# Patient Record
Sex: MALE | Race: WHITE | Employment: OTHER | URBAN - NONMETROPOLITAN AREA
[De-identification: names, ages, dates, MRNs, and addresses within clinical notes are randomized per-mention and may not be internally consistent; named-entity substitution may affect disease eponyms.]

---

## 2021-03-23 ENCOUNTER — IMMUNIZATION (OUTPATIENT)
Age: 81
End: 2021-03-23
Payer: MEDICARE

## 2021-03-23 PROCEDURE — 91300 COVID-19, PFIZER VACCINE 30MCG/0.3ML DOSE: CPT | Performed by: FAMILY MEDICINE

## 2021-03-23 PROCEDURE — 0002A COVID-19, PFIZER VACCINE 30MCG/0.3ML DOSE: CPT | Performed by: FAMILY MEDICINE

## 2024-11-20 PROBLEM — M12.811 ROTATOR CUFF ARTHROPATHY OF RIGHT SHOULDER: Status: ACTIVE | Noted: 2024-11-20

## 2024-11-20 PROBLEM — M12.812 ROTATOR CUFF ARTHROPATHY OF LEFT SHOULDER: Status: ACTIVE | Noted: 2024-11-20

## 2024-11-21 ENCOUNTER — OFFICE VISIT (OUTPATIENT)
Age: 84
End: 2024-11-21

## 2024-11-21 VITALS — BODY MASS INDEX: 27.92 KG/M2 | WEIGHT: 195 LBS | HEIGHT: 70 IN

## 2024-11-21 DIAGNOSIS — M12.811 ROTATOR CUFF ARTHROPATHY OF RIGHT SHOULDER: ICD-10-CM

## 2024-11-21 DIAGNOSIS — M12.812 ROTATOR CUFF ARTHROPATHY OF LEFT SHOULDER: Primary | ICD-10-CM

## 2024-11-21 RX ORDER — ATORVASTATIN CALCIUM 20 MG/1
20 TABLET, FILM COATED ORAL DAILY
COMMUNITY
Start: 2024-09-20

## 2024-11-21 RX ORDER — LIDOCAINE HYDROCHLORIDE 10 MG/ML
2 INJECTION, SOLUTION INFILTRATION; PERINEURAL ONCE
Status: COMPLETED | OUTPATIENT
Start: 2024-11-21 | End: 2024-11-21

## 2024-11-21 RX ORDER — ASPIRIN 81 MG/1
81 TABLET, CHEWABLE ORAL DAILY
COMMUNITY

## 2024-11-21 RX ORDER — LOSARTAN POTASSIUM 100 MG/1
100 TABLET ORAL DAILY
COMMUNITY

## 2024-11-21 RX ORDER — METOPROLOL SUCCINATE 25 MG/1
12.5 TABLET, EXTENDED RELEASE ORAL 2 TIMES DAILY
COMMUNITY
Start: 2024-08-30

## 2024-11-21 RX ORDER — TRIAMCINOLONE ACETONIDE 40 MG/ML
40 INJECTION, SUSPENSION INTRA-ARTICULAR; INTRAMUSCULAR ONCE
Status: COMPLETED | OUTPATIENT
Start: 2024-11-21 | End: 2024-11-21

## 2024-11-21 RX ORDER — BUPIVACAINE HYDROCHLORIDE 2.5 MG/ML
2 INJECTION, SOLUTION INFILTRATION; PERINEURAL ONCE
Status: COMPLETED | OUTPATIENT
Start: 2024-11-21 | End: 2024-11-21

## 2024-11-21 RX ADMIN — TRIAMCINOLONE ACETONIDE 40 MG: 40 INJECTION, SUSPENSION INTRA-ARTICULAR; INTRAMUSCULAR at 11:10

## 2024-11-21 RX ADMIN — LIDOCAINE HYDROCHLORIDE 2 ML: 10 INJECTION, SOLUTION INFILTRATION; PERINEURAL at 11:09

## 2024-11-21 RX ADMIN — BUPIVACAINE HYDROCHLORIDE 5 MG: 2.5 INJECTION, SOLUTION INFILTRATION; PERINEURAL at 11:02

## 2024-11-21 RX ADMIN — BUPIVACAINE HYDROCHLORIDE 5 MG: 2.5 INJECTION, SOLUTION INFILTRATION; PERINEURAL at 11:03

## 2024-11-21 RX ADMIN — LIDOCAINE HYDROCHLORIDE 2 ML: 10 INJECTION, SOLUTION INFILTRATION; PERINEURAL at 11:03

## 2024-11-21 RX ADMIN — TRIAMCINOLONE ACETONIDE 40 MG: 40 INJECTION, SUSPENSION INTRA-ARTICULAR; INTRAMUSCULAR at 11:09

## 2025-02-12 NOTE — PROGRESS NOTES
Orthopaedic Clinic Note - Established Patient    NAME:  Devin Maki   : 1940  MRN: [id number]    2025    CHIEF COMPLAINT:  follow up bilateral shoulder pain, repeat injection    HISTORY OF PRESENT ILLNESS:   The patient is a 84 y.o. right hand dominant male who returns today for follow up of bilateral shoulder pain, requesting repeat injection. It has been 3 months since last injection. Patient denies any recent trauma, fall, or other other injury. Pain is rated 6/10 today. He reports his left shoulder is worse than his right.    Past Medical History:        Diagnosis Date    Allergic rhinitis     Bladder cancer (HCC)     CAD (coronary artery disease)     Colon polyps     Gastritis     Hiatal hernia     Hyperlipidemia     Hypertension        Past Surgical History:        Procedure Laterality Date    COLONOSCOPY      Dr. Torres    COLONOSCOPY      Dr. Torres--colon polyps    COLONOSCOPY N/A 2016    Dr Gauthier-Tubular AP x 1, dysplasia (-), HP x 3, 5 yr recall    CORONARY ARTERY BYPASS GRAFT      stents    CORONARY ARTERY BYPASS GRAFT      UPPER GASTROINTESTINAL ENDOSCOPY  3/7/2011    [facility]    VASCULAR SURGERY      carotid artery cleaned out       Current Medications:   Prior to Admission medications    Medication Sig Start Date End Date Taking? Authorizing Provider   aspirin 81 MG chewable tablet Take 1 tablet by mouth daily   Yes Provider, Historical, MD   losartan (COZAAR) 100 MG tablet Take 1 tablet by mouth daily   Yes Provider, MD Mary   metoprolol succinate (TOPROL XL) 25 MG extended release tablet Take 0.5 tablets by mouth 2 times daily 24  Yes Provider, Historical, MD   atorvastatin (LIPITOR) 20 MG tablet Take 1 tablet by mouth daily 24  Yes Provider, Historical, MD   quinapril (ACCUPRIL) 20 MG tablet Take 1 tablet by mouth daily   Yes Provider, Historical, MD   pantoprazole (PROTONIX) 40 MG tablet Take 1 tablet by mouth daily   Yes Provider,

## 2025-02-27 ENCOUNTER — OFFICE VISIT (OUTPATIENT)
Age: 85
End: 2025-02-27

## 2025-02-27 VITALS — BODY MASS INDEX: 26.34 KG/M2 | WEIGHT: 184 LBS | HEIGHT: 70 IN

## 2025-02-27 DIAGNOSIS — M12.811 ROTATOR CUFF ARTHROPATHY OF RIGHT SHOULDER: ICD-10-CM

## 2025-02-27 DIAGNOSIS — M12.812 ROTATOR CUFF ARTHROPATHY OF LEFT SHOULDER: Primary | ICD-10-CM

## 2025-02-27 RX ORDER — BUPIVACAINE HYDROCHLORIDE 2.5 MG/ML
2 INJECTION, SOLUTION INFILTRATION; PERINEURAL ONCE
Status: COMPLETED | OUTPATIENT
Start: 2025-02-27 | End: 2025-02-27

## 2025-02-27 RX ORDER — LIDOCAINE HYDROCHLORIDE 10 MG/ML
2 INJECTION, SOLUTION INFILTRATION; PERINEURAL ONCE
Status: COMPLETED | OUTPATIENT
Start: 2025-02-27 | End: 2025-02-27

## 2025-02-27 RX ORDER — TRIAMCINOLONE ACETONIDE 40 MG/ML
40 INJECTION, SUSPENSION INTRA-ARTICULAR; INTRAMUSCULAR ONCE
Status: COMPLETED | OUTPATIENT
Start: 2025-02-27 | End: 2025-02-27

## 2025-02-27 RX ADMIN — TRIAMCINOLONE ACETONIDE 40 MG: 40 INJECTION, SUSPENSION INTRA-ARTICULAR; INTRAMUSCULAR at 09:59

## 2025-02-27 RX ADMIN — LIDOCAINE HYDROCHLORIDE 2 ML: 10 INJECTION, SOLUTION INFILTRATION; PERINEURAL at 09:59

## 2025-02-27 RX ADMIN — LIDOCAINE HYDROCHLORIDE 2 ML: 10 INJECTION, SOLUTION INFILTRATION; PERINEURAL at 09:58

## 2025-02-27 RX ADMIN — BUPIVACAINE HYDROCHLORIDE 5 MG: 2.5 INJECTION, SOLUTION INFILTRATION; PERINEURAL at 09:58

## 2025-06-12 NOTE — PROGRESS NOTES
MEDICAL ONCOLOGY CONSULTATION    Patient Name: Devin Maki  MRN: [id number]  YOB: 1940  Date of evaluation: 6/16/2025    REASON FOR CONSULTATION:  Bladder cancer/Prostate cancer  REQUESTING PHYSICIAN: Dr Anthony Jackman/[facility]    History Obtained From:  patient and old medical records    HISTORY OF PRESENT ILLNESS:    Diagnosis  #1 Cancer history-Bladder cancer  Noninvasive papillary urothelial carcinoma, bladder, Feb 2018  High grade  Recurrent noninvasive papillary urothelial carcinoma, bladder, April 2021  High grade  Recurrent noninvasive papillary urothelial carcinoma, bladder, Aug 2022  Low grade  Recurrent noninvasive papillary urothelial carcinoma, bladder, July 2023  Low grade  Multifocal recurrent noninvasive papillary urothelial carcinoma, bladder, Nov 2023  Low grade  Recurrent noninvasive papillary urothelial carcinoma, bladder, April 2024  Low grade  Recurrent noninvasive papillary urothelial carcinoma, bladder with extension into prostate, April 2024  Low grade  Invasive urothelial carcinoma, bladder, June 2025  WHO/ISUP grade: High  Stage IIIB; pT3a, pN2, cM0, May 2025    #2 Cancer history-Prostate cancer  Adenocarcinoma, prostate, March 2018  Archana 6  Acinar adenocarcinoma, Prostate cancer, June 2025  Archana score 7  Stage IIB; pT2, pN0, cM0      Treatment Summary  Bladder cancer, Feb 2018 2/26/18 Transurethral resection bladder tumor by Dr Grayson Peralta/[facility]  August 2018 - 2019 First cycle 18 months of BCG therapy by Dr Grayson Peralta/[facility]  4/1/21 Transurethral resection bladder tumor by Dr Grayson Peralta/[facility]  May 2021 Initiated second cycle 18 months of BCG therapy by Dr Grayson Peralta/[facility]  8/25/22 Transurethral resection bladder tumor by Dr Grayson Peralta/[facility]  1/26/23 Transurethral resection bladder tumor by Dr Grayson Peralta/[facility]  7/13/23 Transurethral resection bladder tumor by Dr Grayson Peralta/[facility]  11/9/23 Transurethral resection

## 2025-06-16 ENCOUNTER — HOSPITAL ENCOUNTER (OUTPATIENT)
Dept: INFUSION THERAPY | Age: 85
Discharge: HOME | End: 2025-06-16
Payer: MEDICARE

## 2025-06-16 ENCOUNTER — OFFICE VISIT (OUTPATIENT)
Dept: HEMATOLOGY | Age: 85
End: 2025-06-16
Payer: MEDICARE

## 2025-06-16 VITALS
BODY MASS INDEX: 25.96 KG/M2 | DIASTOLIC BLOOD PRESSURE: 40 MMHG | SYSTOLIC BLOOD PRESSURE: 120 MMHG | WEIGHT: 181.3 LBS | TEMPERATURE: 98 F | HEIGHT: 70 IN

## 2025-06-16 DIAGNOSIS — C67.9 MALIGNANT NEOPLASM OF URINARY BLADDER, UNSPECIFIED SITE (HCC): ICD-10-CM

## 2025-06-16 DIAGNOSIS — C68.9 UROTHELIAL CANCER (HCC): Primary | ICD-10-CM

## 2025-06-16 DIAGNOSIS — C68.9 UROTHELIAL CANCER (HCC): ICD-10-CM

## 2025-06-16 DIAGNOSIS — D50.9 MICROCYTIC ANEMIA: ICD-10-CM

## 2025-06-16 DIAGNOSIS — R53.81 PHYSICAL DECONDITIONING: ICD-10-CM

## 2025-06-16 DIAGNOSIS — E44.1 MILD PROTEIN-CALORIE MALNUTRITION: ICD-10-CM

## 2025-06-16 DIAGNOSIS — N28.9 RENAL IMPAIRMENT: ICD-10-CM

## 2025-06-16 DIAGNOSIS — E87.1 HYPONATREMIA: ICD-10-CM

## 2025-06-16 DIAGNOSIS — Z71.89 CARE PLAN DISCUSSED WITH PATIENT: ICD-10-CM

## 2025-06-16 DIAGNOSIS — C61 ADENOCARCINOMA OF PROSTATE (HCC): ICD-10-CM

## 2025-06-16 DIAGNOSIS — D75.838 REACTIVE THROMBOCYTOSIS: ICD-10-CM

## 2025-06-16 LAB
ALBUMIN SERPL-MCNC: 3.3 G/DL (ref 3.5–5.2)
ALP SERPL-CCNC: 106 U/L (ref 40–129)
ALT SERPL-CCNC: 13 U/L (ref 5–41)
ANION GAP SERPL CALCULATED.3IONS-SCNC: 12 MMOL/L (ref 7–19)
AST SERPL-CCNC: 28 U/L (ref 5–40)
BASOPHILS # BLD: 0.1 K/UL (ref 0–0.2)
BASOPHILS NFR BLD: 1 % (ref 0–1)
BILIRUB SERPL-MCNC: <0.2 MG/DL (ref 0–1.2)
BUN SERPL-MCNC: 36 MG/DL (ref 8–23)
CALCIUM SERPL-MCNC: 8.3 MG/DL (ref 8.8–10.2)
CHLORIDE SERPL-SCNC: 103 MMOL/L (ref 98–107)
CO2 SERPL-SCNC: 18 MMOL/L (ref 22–29)
CREAT SERPL-MCNC: 1.3 MG/DL (ref 0.7–1.2)
EOSINOPHIL # BLD: 0.71 K/UL (ref 0–0.6)
EOSINOPHIL NFR BLD: 6.9 % (ref 0–5)
ERYTHROCYTE [DISTWIDTH] IN BLOOD BY AUTOMATED COUNT: 16.8 % (ref 11.5–14.5)
GLUCOSE SERPL-MCNC: 134 MG/DL (ref 70–99)
HCT VFR BLD AUTO: 26.6 % (ref 42–52)
HGB BLD-MCNC: 8.3 G/DL (ref 14–18)
LYMPHOCYTES # BLD: 2.49 K/UL (ref 1.1–4.5)
LYMPHOCYTES NFR BLD: 24.1 % (ref 20–40)
MCH RBC QN AUTO: 22.4 PG (ref 27–31)
MCHC RBC AUTO-ENTMCNC: 31.2 G/DL (ref 33–37)
MCV RBC AUTO: 71.7 FL (ref 80–94)
MONOCYTES # BLD: 1.36 K/UL (ref 0–0.9)
MONOCYTES NFR BLD: 13.2 % (ref 1–10)
NEUTROPHILS # BLD: 5.54 K/UL (ref 1.5–7.5)
NEUTS SEG NFR BLD: 53.5 % (ref 50–65)
PLATELET # BLD AUTO: 522 K/UL (ref 130–400)
PMV BLD AUTO: 9.5 FL (ref 9.4–12.4)
POTASSIUM SERPL-SCNC: 5.1 MMOL/L (ref 3.5–5.1)
PROT SERPL-MCNC: 6.7 G/DL (ref 6.4–8.3)
RBC # BLD AUTO: 3.71 M/UL (ref 4.7–6.1)
SODIUM SERPL-SCNC: 133 MMOL/L (ref 136–145)
WBC # BLD AUTO: 10.33 K/UL (ref 4.8–10.8)

## 2025-06-16 PROCEDURE — 1125F AMNT PAIN NOTED PAIN PRSNT: CPT | Performed by: INTERNAL MEDICINE

## 2025-06-16 PROCEDURE — 80053 COMPREHEN METABOLIC PANEL: CPT

## 2025-06-16 PROCEDURE — 1123F ACP DISCUSS/DSCN MKR DOCD: CPT | Performed by: INTERNAL MEDICINE

## 2025-06-16 PROCEDURE — 36415 COLL VENOUS BLD VENIPUNCTURE: CPT

## 2025-06-16 PROCEDURE — 99205 OFFICE O/P NEW HI 60 MIN: CPT | Performed by: INTERNAL MEDICINE

## 2025-06-16 PROCEDURE — 99214 OFFICE O/P EST MOD 30 MIN: CPT

## 2025-06-16 PROCEDURE — 1159F MED LIST DOCD IN RCRD: CPT | Performed by: INTERNAL MEDICINE

## 2025-06-16 PROCEDURE — 85025 COMPLETE CBC W/AUTO DIFF WBC: CPT

## 2025-06-16 PROCEDURE — G2211 COMPLEX E/M VISIT ADD ON: HCPCS | Performed by: INTERNAL MEDICINE

## 2025-06-16 RX ORDER — LANOLIN ALCOHOL/MO/W.PET/CERES
1000 CREAM (GRAM) TOPICAL DAILY
COMMUNITY

## 2025-06-16 RX ORDER — TRAMADOL HYDROCHLORIDE 50 MG/1
1 TABLET ORAL EVERY 8 HOURS PRN
COMMUNITY

## 2025-06-16 RX ORDER — APIXABAN 2.5 MG/1
2.5 TABLET, FILM COATED ORAL 2 TIMES DAILY
COMMUNITY
Start: 2025-06-06

## 2025-06-16 RX ORDER — LEVOFLOXACIN 500 MG/1
500 TABLET, FILM COATED ORAL DAILY
COMMUNITY
Start: 2025-02-12

## 2025-06-16 RX ORDER — LORAZEPAM 1 MG/1
1 TABLET ORAL DAILY PRN
COMMUNITY

## 2025-06-16 RX ORDER — FUROSEMIDE 40 MG/1
40 TABLET ORAL DAILY
COMMUNITY

## 2025-06-17 ENCOUNTER — CLINICAL DOCUMENTATION (OUTPATIENT)
Dept: HEMATOLOGY | Age: 85
End: 2025-06-17

## 2025-06-17 DIAGNOSIS — C68.9 UROTHELIAL CANCER (HCC): Primary | ICD-10-CM

## 2025-06-17 DIAGNOSIS — D50.9 MICROCYTIC ANEMIA: Primary | ICD-10-CM

## 2025-06-17 NOTE — PROGRESS NOTES
Patient was referred to [facility]. They do not accept patients insurance. New referral placed to [facility] in hopes they will be able to help serve patient with their home health needs. Electronically signed by Brittney Arroyo RN on 6/17/2025 at 3:53 PM

## 2025-06-18 ENCOUNTER — HOSPITAL ENCOUNTER (OUTPATIENT)
Dept: WOUND CARE | Age: 85
Discharge: HOME | End: 2025-06-18
Attending: SURGERY
Payer: MEDICARE

## 2025-06-18 VITALS
TEMPERATURE: 97.5 F | WEIGHT: 181.3 LBS | HEIGHT: 69 IN | DIASTOLIC BLOOD PRESSURE: 53 MMHG | RESPIRATION RATE: 16 BRPM | SYSTOLIC BLOOD PRESSURE: 126 MMHG | BODY MASS INDEX: 26.85 KG/M2

## 2025-06-18 PROCEDURE — 99213 OFFICE O/P EST LOW 20 MIN: CPT

## 2025-06-18 RX ORDER — ACETAMINOPHEN 325 MG/1
650 TABLET ORAL EVERY 6 HOURS PRN
COMMUNITY

## 2025-06-18 NOTE — PATIENT INSTRUCTIONS
[facility] Wound Care and Hyperbaric Oxygen Therapy   Physician Orders and Discharge Instructions  [address] [place] [address]  [address]  [place] [address]  Telephone: [phone]      FAX [phone]    NAME:  Devni Maki  YOB: 1940  MEDICAL RECORD NUMBER:  [id number]  DATE:  6/18/2025    Discharge condition: Stable    Discharge to: Home    Left via:Private automobile    Accompanied by:  daughter    ECF/HHA:            [facility]  Coloplast 1-piece Custom      Change Pouch and Wafer: Every 3-5 days and as needed for leaking   Supplies:     SenSura Norberto Flex flat #[id number]   Brava protective Seal thin #66064  Brava Powder #[id number]  Brava Elastic Barrier Strips #[id number]  3M Cavilon No Sting Skin Barrier #[id number]  Esenta Sting Free Adhesive Remover Spray #[id number] [id number]    Gather supplies needed to change pouch and wafer.     Gently remove your old pouch     Look at the back of pouch before throwing away to see how it has worn.    Wash the skin around your stoma with water. Pat completely dry.    Use the measuring guide to measure your stoma size or use the old pattern to trace correct size opening on plastic backing of wafer.     Cut out the opening and remove the plastic backing from the wafer.      Optional - Apply Protective Barrier to skin around the stoma or to the back of the wafer.  If skin irritated dust with stoma powder, dust off the excess, then apply no sting skin protectant over the powdered areas    Apply Barrier Ring (gasket) to the opening of the wafer, then center your wafer by centering the opening of the wafer around the stoma and press the wafer down firmly.      Be sure to roll the end up 3 times and flip the tabs over to secure.    Lay quietly for 15-20 minutes to allow the adhesives to mold to your skin.    Empty Pouch    Empty the pouch when it’s 1/3 to 1/2 full of gas or stool     Wipe out tail-end of pouch with toilet tissue.      Close the end of the

## 2025-06-18 NOTE — PLAN OF CARE
Problem: ABCDS Injury Assessment  Goal: Absence of physical injury  Outcome: Progressing   Uses cane  Problem: Wound:  Goal: Will show signs of wound healing; wound closure and no evidence of infection  Description: Will show signs of wound healing; wound closure and no evidence of infection  Outcome: Progressing   Urostomy Care  Problem: Smoking cessation:  Goal: Ability to formulate a plan to maintain a tobacco-free life will be supported  Description: Ability to formulate a plan to maintain a tobacco-free life will be supported  Outcome: Progressing   Smokeless tobacco

## 2025-06-18 NOTE — WOUND CARE
Clinical Level of Care Assessment    Outpatient Ostomy Care      NAME:  Devin Maki  YOB: 1940  MEDICAL RECORD NUMBER:  [id number]   DATE:  6/18/2025      Patient /Ostomy Assessment- Document in Flowsheet I&O   Points   Review of chart [x]   0   Assess Complete Ostomy tab in Navigator for assessment of; stoma status, peristomal skin, presence of hernia/stool consistency/diet/related medications   Simple adjustments to pouch size/pouch system, new stoma pattern, accessory addition/deletion.   []   1   Assess Complete Ostomy tab in Navigator for assessment of; stoma status, peristomal skin, presence of hernia/stool consistency/diet/related medications   Moderate adjustments to pouch size/pouch system, new stoma pattern, accessory addition/deletion.  Observe patient/caregiver with hands-on care.   1-2 adjustments to pouch size/system/skin care/accessory addition or deletion.    [x]   2   Assess Complete Ostomy tab in Navigator for assessment of; stoma status, peristomal skin, presence of hernia/stool consistency/diet/related medications   Complex adjustments to pouch size/pouch system, new stoma pattern, accessory addition/deletion.  3 or more complex adjustments to pouch size/system/skin care/accessory addition or deletion.  Observe patient/caregiver with hands-on care.   Assess patient/patient abdomen for optimal pre-marked stoma site.  Assess patient abdomen for type of hernia belt/accessory needed. []   3         Ambulation Status Documented in CN Clinical Note  Status Definition Points   Independent Independently able to ambulate.  Fully able (without any assistance) to get on/off exam table/chair.    []   0   Minimal Physical Assistance Requires physical assistance of one person to ambulate and/or position patient to be examined. Includes necessary physical assistance to position lower extremities on/off stool.   [x]   1   Moderate Physical Assistance Requires at least one staff member to 
[facility] Outpatient   Ostomy Note      NAME:  Devin Maki  MEDICAL RECORD NUMBER:  [id number]  AGE: 85 y.o.   GENDER:  male  :  1940  TODAY'S DATE:  2025    Subjective       Chief Complaint   Patient presents with    Wound Check     Urostomycare         HISTORY of PRESENT ILLNESS HPI    Devin Maki is a 85 y.o. male New patient referred by Dr. Ezekiel Crum, who presents today for ostomy/stoma evaluation.   History of Ostomy Context: 25 Robotic assisted laparoscopic radical cystectomy, prostatectomy, positive () pelvic lymphadenectomy, intracorporeal ileal conduit, omental pedicle flap by Dr Anthony Jackman/[facility]  Wound/Ulcer Pain Timing/Severity: none  Quality of pain: N/A  Severity:  0 / 10   Associated Signs/Symptoms: no pain voiced at this time    PAST MEDICAL HISTORY        Diagnosis Date    Allergic rhinitis     Bladder cancer (HCC)     CAD (coronary artery disease)     Colon polyps     Gastritis     Hiatal hernia     Hyperlipidemia     Hypertension        PAST SURGICAL HISTORY    Past Surgical History:   Procedure Laterality Date    COLONOSCOPY      Dr. Torres    COLONOSCOPY      Dr. Torres--colon polyps    COLONOSCOPY N/A 2016    Dr Gauthier-Tubular AP x 1, dysplasia (-), HP x 3, 5 yr recall    CORONARY ARTERY BYPASS GRAFT      stents    CORONARY ARTERY BYPASS GRAFT      UPPER GASTROINTESTINAL ENDOSCOPY  3/7/2011    [facility]    VASCULAR SURGERY      carotid artery cleaned out       FAMILY HISTORY    Family History   Problem Relation Age of Onset    Cancer Mother         tongue cancer    Heart Disease Father     Stomach Cancer Sister     Prostate Cancer Brother     Unknown Maternal Grandmother     Unknown Maternal Grandfather     Unknown Paternal Grandmother     Unknown Paternal Grandfather     Colon Cancer Neg Hx     Colon Polyps Neg Hx     Esophageal Cancer Neg Hx     Liver Disease Neg Hx     Liver Cancer Neg Hx     Rectal Cancer Neg Hx  
questions and concerns addressed.    Plan to follow up in one week, to see how peristomal skin is healing and to follow up on using the 1 piece appliance.     Patient is to begin learning to change the appliance himself with next visit.       Urostomy RLQ (Active)   Stomal Appliance 1 piece 06/18/25 1333   Flange Size (inches) 2.25 Inches 06/18/25 1333   Stoma  Assessment Protrudes;Moist;Red;Other (Comment) 06/18/25 1333   Peristomal Assessment Pink;Denuded 06/18/25 1333   Mucocutaneous Junction Intact 06/18/25 1333   Collection Container Belly bag 06/18/25 1333   Securement Method Tape 06/18/25 1333   Treatment Barrier ring;Pouch change;Liquid skin barrier;Stoma powder;Site care;Tape changed 06/18/25 1333   Urine Color Yellow 06/18/25 1333   Urine Appearance Clear 06/18/25 1333   Output (ml) 100 ml 06/18/25 1333   Number of days: 16       Urostomy RLQ (Active)   Number of days: 16       LABS       CBC:   Lab Results   Component Value Date/Time    WBC 10.33 06/16/2025 02:21 PM    HGB 8.3 06/16/2025 02:21 PM    HCT 26.6 06/16/2025 02:21 PM    MCV 71.7 06/16/2025 02:21 PM     06/16/2025 02:21 PM     BMP:   Lab Results   Component Value Date/Time     06/16/2025 02:21 PM    K 5.1 06/16/2025 02:21 PM     06/16/2025 02:21 PM    CO2 18 06/16/2025 02:21 PM    BUN 36 06/16/2025 02:21 PM    CREATININE 1.3 06/16/2025 02:21 PM     PT/INR: No results found for: \"PROTIME\", \"INR\"  Prealbumin: No results found for: \"PREALBUMIN\"  Albumin:No results found for: \"LABALBU\"  Sed Rate:No results found for: \"SEDRATE\"  Micro: No results found for: \"BC\"     Assessment     Ostomy Diagnosis:    Z43.6 Attention to Urostomy    Patient Active Problem List   Diagnosis Code    History of colon polyps Z86.0100    Rotator cuff arthropathy of left shoulder M12.812    Rotator cuff arthropathy of right shoulder M12.811    Malignant neoplasm of urinary bladder (HCC) C67.9         Plan   Patient examined and evaluated    Plan for Ostomy

## 2025-06-25 ENCOUNTER — HOSPITAL ENCOUNTER (OUTPATIENT)
Dept: WOUND CARE | Age: 85
Discharge: HOME | End: 2025-06-25
Attending: SURGERY
Payer: MEDICARE

## 2025-06-25 ENCOUNTER — TELEPHONE (OUTPATIENT)
Dept: HEMATOLOGY | Age: 85
End: 2025-06-25

## 2025-06-25 VITALS
SYSTOLIC BLOOD PRESSURE: 126 MMHG | DIASTOLIC BLOOD PRESSURE: 53 MMHG | RESPIRATION RATE: 16 BRPM | TEMPERATURE: 97.3 F | BODY MASS INDEX: 26.85 KG/M2 | WEIGHT: 181.3 LBS | HEART RATE: 59 BPM | HEIGHT: 69 IN

## 2025-06-25 DIAGNOSIS — D50.8 OTHER IRON DEFICIENCY ANEMIA: ICD-10-CM

## 2025-06-25 DIAGNOSIS — C68.9 UROTHELIAL CANCER (HCC): Primary | ICD-10-CM

## 2025-06-25 DIAGNOSIS — C61 ADENOCARCINOMA OF PROSTATE (HCC): ICD-10-CM

## 2025-06-25 PROCEDURE — 99212 OFFICE O/P EST SF 10 MIN: CPT

## 2025-06-25 NOTE — PROGRESS NOTES
recommendations of consideration of RT (category 2B.    Plan:  - Regular scans to monitor condition-neck 6-month December 2025  - Referral to Dr. Fleming for formal radiation therapy consultation  - Continue exercising  - Maintain a protein-rich diet  - Monitor blood pressure closely    2. Low blood pressure: Reports of dizziness and lightheadedness upon standing, likely related to current blood pressure medications.  - Stop taking Norvasc  - Monitor blood pressure at home  -Contact PCP for further adjustment  - Contact primary care physician for further adjustments to blood pressure management    3.Prostate cancer: Status post radical cystoprostatectomy and pelvic lymph node dissection, with no involvement of lymph nodes by prostatic cancer. Final pathological stage pT2 N0 M0.  - No further systemic therapy recommended.  - PSA will continue to be monitored.  - No intention to pursue radiation therapy at this time.    4.  Microcytic anemia  -Hemoglobin 9.0/MCV 72  - Anemia profile performed today-ferritin and iron profile  - Check B12/folate  - Hemolytic panel  - Iron profile consistent with iron deficiency anemia  - IV iron replacement.  Poor oral tolerance    ?? Iron Panel Interpretation (Suggestive of Iron Deficiency):  Test Result Reference Range Interpretation   Iron 29 µg/dL  µg/dL Low   TIBC 357 µg/dL 250-450 µg/dL Normal-High   % Saturation 8% 20-50% Very Low   Ferritin 46.9 ng/mL  ng/mL Low-Normal (borderline)   ?? Interpretation:  This pattern--low iron, low % saturation, borderline ferritin--is suggestive of early or mild iron deficiency. Ferritin can be falsely normal in inflammation, so a level <50 with low serum iron is still suspicious.    ?? Vitamin & Reticulocyte Panel:  Test Result Interpretation   Vitamin B12 630 pg/mL Normal   Folate 8.8 ng/mL Normal   Retic % 1.08% Low-Normal   Absolute Retic 0.0437 x10?/?L Low-Normal   Haptoglobin 286 mg/dL Normal-High   ?? Interpretation:  No

## 2025-06-25 NOTE — WOUND CARE
[facility] Outpatient   Ostomy Note      NAME:  Devin Maki  MEDICAL RECORD NUMBER:  [id number]  AGE: 85 y.o.   GENDER:  male  :  1940  TODAY'S DATE:  2025    Subjective       Chief Complaint   Patient presents with    Wound Check     urostomy         HISTORY of PRESENT ILLNESS HPI    Devin Maki is a 85 y.o. male Established patient , who presents today for ostomy/stoma evaluation.   History of Ostomy Context:  25 Robotic assisted laparoscopic radical cystectomy, prostatectomy, positive () pelvic lymphadenectomy, intracorporeal ileal conduit, omental pedicle flap by Dr Anthony Jackman/[facility]   Wound/Ulcer Pain Timing/Severity: none  Quality of pain: N/A  Severity:  0 / 10   Associated Signs/Symptoms: none    PAST MEDICAL HISTORY        Diagnosis Date    Allergic rhinitis     Bladder cancer (HCC)     CAD (coronary artery disease)     Colon polyps     Gastritis     Hiatal hernia     Hyperlipidemia     Hypertension        PAST SURGICAL HISTORY    Past Surgical History:   Procedure Laterality Date    COLONOSCOPY      Dr. Torres    COLONOSCOPY      Dr. Torres--colon polyps    COLONOSCOPY N/A 2016    Dr Gauthier-Tubular AP x 1, dysplasia (-), HP x 3, 5 yr recall    CORONARY ARTERY BYPASS GRAFT      stents    CORONARY ARTERY BYPASS GRAFT      UPPER GASTROINTESTINAL ENDOSCOPY  3/7/2011    [facility]    VASCULAR SURGERY      carotid artery cleaned out       FAMILY HISTORY    Family History   Problem Relation Age of Onset    Cancer Mother         tongue cancer    Heart Disease Father     Stomach Cancer Sister     Prostate Cancer Brother     Unknown Maternal Grandmother     Unknown Maternal Grandfather     Unknown Paternal Grandmother     Unknown Paternal Grandfather     Colon Cancer Neg Hx     Colon Polyps Neg Hx     Esophageal Cancer Neg Hx     Liver Disease Neg Hx     Liver Cancer Neg Hx     Rectal Cancer Neg Hx        SOCIAL HISTORY    Social History     Tobacco

## 2025-06-25 NOTE — PLAN OF CARE
Problem: Wound:  Goal: Will show signs of wound healing; wound closure and no evidence of infection  Description: Will show signs of wound healing; wound closure and no evidence of infection  Outcome: Progressing   Urostomy care

## 2025-06-25 NOTE — PATIENT INSTRUCTIONS
[facility] Wound Care and Hyperbaric Oxygen Therapy   Physician Orders and Discharge Instructions  [address] [place] [address]  [address]  [place] [address]  Telephone: [phone]      FAX [phone]    NAME:  Devin Maki  YOB: 1940  MEDICAL RECORD NUMBER:  [id number]  DATE:  6/25/2025    Discharge condition: Stable    Discharge to: Home    Left via:Private automobile    Accompanied by:  daughter    ECF/HHA:            [facility]  Coloplast 1-piece Custom      Change Pouch and Wafer: Every 3-5 days and as needed for leaking   Supplies:     SenSura Norberto Flex flat #[id number]   Brava protective Seal thin #32959  Brava Powder #[id number]  Brava Elastic Barrier Strips #[id number]  3M Cavilon No Sting Skin Barrier #[id number]  Esenta Sting Free Adhesive Remover Spray #Convatec [id number]    Gather supplies needed to change pouch and wafer.     Gently remove your old pouch     Look at the back of pouch before throwing away to see how it has worn.    Wash the skin around your stoma with water. Pat completely dry.    Use the measuring guide to measure your stoma size or use the old pattern to trace correct size opening on plastic backing of wafer.     Cut out the opening and remove the plastic backing from the wafer.      Optional - Apply Protective Barrier to skin around the stoma or to the back of the wafer.  If skin irritated dust with stoma powder, dust off the excess, then apply no sting skin protectant over the powdered areas    Apply Barrier Ring (gasket) to the opening of the wafer, then center your wafer by centering the opening of the wafer around the stoma and press the wafer down firmly.      Be sure to roll the end up 3 times and flip the tabs over to secure.    Lay quietly for 15-20 minutes to allow the adhesives to mold to your skin.    Empty Pouch    Empty the pouch when it’s 1/3 to 1/2 full of gas or stool     Wipe out tail-end of pouch with toilet tissue.      Close the end of the

## 2025-06-25 NOTE — WOUND CARE
Clinical Level of Care Assessment    Outpatient Ostomy Care      NAME:  Devin Maki  YOB: 1940  MEDICAL RECORD NUMBER:  [id number]   DATE:  6/25/2025      Patient /Ostomy Assessment- Document in Flowsheet I&O   Points   Review of chart [x]   0   Assess Complete Ostomy tab in Navigator for assessment of; stoma status, peristomal skin, presence of hernia/stool consistency/diet/related medications   Simple adjustments to pouch size/pouch system, new stoma pattern, accessory addition/deletion.   []   1   Assess Complete Ostomy tab in Navigator for assessment of; stoma status, peristomal skin, presence of hernia/stool consistency/diet/related medications   Moderate adjustments to pouch size/pouch system, new stoma pattern, accessory addition/deletion.  Observe patient/caregiver with hands-on care.   1-2 adjustments to pouch size/system/skin care/accessory addition or deletion.    [x]   2   Assess Complete Ostomy tab in Navigator for assessment of; stoma status, peristomal skin, presence of hernia/stool consistency/diet/related medications   Complex adjustments to pouch size/pouch system, new stoma pattern, accessory addition/deletion.  3 or more complex adjustments to pouch size/system/skin care/accessory addition or deletion.  Observe patient/caregiver with hands-on care.   Assess patient/patient abdomen for optimal pre-marked stoma site.  Assess patient abdomen for type of hernia belt/accessory needed. []   3         Ambulation Status Documented in CN Clinical Note  Status Definition Points   Independent Independently able to ambulate.  Fully able (without any assistance) to get on/off exam table/chair.    []   0   Minimal Physical Assistance Requires physical assistance of one person to ambulate and/or position patient to be examined. Includes necessary physical assistance to position lower extremities on/off stool.   [x]   1   Moderate Physical Assistance Requires at least one staff member to

## 2025-06-26 ENCOUNTER — TELEPHONE (OUTPATIENT)
Dept: WOUND CARE | Age: 85
End: 2025-06-26

## 2025-06-27 ENCOUNTER — TELEPHONE (OUTPATIENT)
Dept: WOUND CARE | Age: 85
End: 2025-06-27

## 2025-06-27 NOTE — TELEPHONE ENCOUNTER
Call placed to patient and spoke with him. Informed that called [employer] [facility] [phone] and they will be sending out a urostomy night time drainage bag in 2-3 days. Discussed he will not need an adaptor with bag, that the end of this tbing will fit to the bottom drain spout of his urostomy appliance. Hopefully this will help with the leakage he has been having during the night with his other bag. Patient verbalized understanding.

## 2025-06-30 ENCOUNTER — HOSPITAL ENCOUNTER (OUTPATIENT)
Dept: INFUSION THERAPY | Age: 85
Discharge: HOME | End: 2025-06-30
Payer: MEDICARE

## 2025-06-30 ENCOUNTER — OFFICE VISIT (OUTPATIENT)
Dept: HEMATOLOGY | Age: 85
End: 2025-06-30

## 2025-06-30 ENCOUNTER — OFFICE VISIT (OUTPATIENT)
Dept: HEMATOLOGY | Age: 85
End: 2025-06-30
Payer: MEDICARE

## 2025-06-30 VITALS
DIASTOLIC BLOOD PRESSURE: 48 MMHG | OXYGEN SATURATION: 97 % | TEMPERATURE: 98.6 F | HEART RATE: 61 BPM | BODY MASS INDEX: 24.92 KG/M2 | SYSTOLIC BLOOD PRESSURE: 100 MMHG | HEIGHT: 70 IN | WEIGHT: 174.1 LBS

## 2025-06-30 VITALS — BODY MASS INDEX: 24.98 KG/M2 | HEIGHT: 70 IN

## 2025-06-30 DIAGNOSIS — D50.9 MICROCYTIC ANEMIA: ICD-10-CM

## 2025-06-30 DIAGNOSIS — C61 ADENOCARCINOMA OF PROSTATE (HCC): ICD-10-CM

## 2025-06-30 DIAGNOSIS — R53.81 PHYSICAL DECONDITIONING: ICD-10-CM

## 2025-06-30 DIAGNOSIS — Z71.89 CARE PLAN DISCUSSED WITH PATIENT: Primary | ICD-10-CM

## 2025-06-30 DIAGNOSIS — R53.1 WEAKNESS GENERALIZED: ICD-10-CM

## 2025-06-30 DIAGNOSIS — N28.9 RENAL IMPAIRMENT: ICD-10-CM

## 2025-06-30 DIAGNOSIS — D50.8 OTHER IRON DEFICIENCY ANEMIA: ICD-10-CM

## 2025-06-30 DIAGNOSIS — C68.9 UROTHELIAL CANCER (HCC): ICD-10-CM

## 2025-06-30 DIAGNOSIS — C68.9 UROTHELIAL CANCER (HCC): Primary | ICD-10-CM

## 2025-06-30 LAB
ALBUMIN SERPL-MCNC: 3.6 G/DL (ref 3.5–5.2)
ALP SERPL-CCNC: 153 U/L (ref 40–129)
ALT SERPL-CCNC: 17 U/L (ref 5–41)
ANION GAP SERPL CALCULATED.3IONS-SCNC: 13 MMOL/L (ref 7–19)
AST SERPL-CCNC: 29 U/L (ref 5–40)
BASOPHILS # BLD: 0.05 K/UL (ref 0–0.2)
BASOPHILS NFR BLD: 0.6 % (ref 0–1)
BILIRUB SERPL-MCNC: 0.3 MG/DL (ref 0–1.2)
BUN SERPL-MCNC: 36 MG/DL (ref 8–23)
CALCIUM SERPL-MCNC: 8.8 MG/DL (ref 8.8–10.2)
CHLORIDE SERPL-SCNC: 106 MMOL/L (ref 98–107)
CO2 SERPL-SCNC: 20 MMOL/L (ref 22–29)
CREAT SERPL-MCNC: 1.3 MG/DL (ref 0.7–1.2)
EOSINOPHIL # BLD: 0.45 K/UL (ref 0–0.6)
EOSINOPHIL NFR BLD: 5.5 % (ref 0–5)
ERYTHROCYTE [DISTWIDTH] IN BLOOD BY AUTOMATED COUNT: 18.1 % (ref 11.5–14.5)
FERRITIN SERPL-MCNC: 46.9 NG/ML (ref 30–400)
FOLATE SERPL-MCNC: 8.8 NG/ML (ref 4.5–32.2)
GLUCOSE SERPL-MCNC: 121 MG/DL (ref 70–99)
HAPTOGLOB SERPL-MCNC: 286 MG/DL (ref 30–200)
HCT VFR BLD AUTO: 29.2 % (ref 42–52)
HGB BLD-MCNC: 9 G/DL (ref 14–18)
IRON SATN MFR SERPL: 8 % (ref 20–50)
IRON SERPL-MCNC: 29 UG/DL (ref 59–158)
LYMPHOCYTES # BLD: 1.97 K/UL (ref 1.1–4.5)
LYMPHOCYTES NFR BLD: 24 % (ref 20–40)
MCH RBC QN AUTO: 22.2 PG (ref 27–31)
MCHC RBC AUTO-ENTMCNC: 30.8 G/DL (ref 33–37)
MCV RBC AUTO: 72.1 FL (ref 80–94)
MONOCYTES # BLD: 0.84 K/UL (ref 0–0.9)
MONOCYTES NFR BLD: 10.2 % (ref 1–10)
NEUTROPHILS # BLD: 4.87 K/UL (ref 1.5–7.5)
NEUTS SEG NFR BLD: 59.3 % (ref 50–65)
PLATELET # BLD AUTO: 241 K/UL (ref 130–400)
PMV BLD AUTO: 11.1 FL (ref 9.4–12.4)
POTASSIUM SERPL-SCNC: 3.9 MMOL/L (ref 3.5–5.1)
PROT SERPL-MCNC: 7.1 G/DL (ref 6.4–8.3)
RBC # BLD AUTO: 4.05 M/UL (ref 4.7–6.1)
RETICS # AUTO: 0.04 M/UL (ref 0.03–0.12)
RETICS/RBC NFR: 1.08 % (ref 0.5–1.5)
SODIUM SERPL-SCNC: 139 MMOL/L (ref 136–145)
TIBC SERPL-MCNC: 357 UG/DL (ref 250–400)
VIT B12 SERPL-MCNC: 630 PG/ML (ref 232–1245)
WBC # BLD AUTO: 8.21 K/UL (ref 4.8–10.8)

## 2025-06-30 PROCEDURE — 82607 VITAMIN B-12: CPT

## 2025-06-30 PROCEDURE — 1159F MED LIST DOCD IN RCRD: CPT | Performed by: INTERNAL MEDICINE

## 2025-06-30 PROCEDURE — 99215 OFFICE O/P EST HI 40 MIN: CPT | Performed by: INTERNAL MEDICINE

## 2025-06-30 PROCEDURE — 83010 ASSAY OF HAPTOGLOBIN QUANT: CPT

## 2025-06-30 PROCEDURE — 85045 AUTOMATED RETICULOCYTE COUNT: CPT

## 2025-06-30 PROCEDURE — 80053 COMPREHEN METABOLIC PANEL: CPT

## 2025-06-30 PROCEDURE — G2211 COMPLEX E/M VISIT ADD ON: HCPCS | Performed by: INTERNAL MEDICINE

## 2025-06-30 PROCEDURE — 85025 COMPLETE CBC W/AUTO DIFF WBC: CPT

## 2025-06-30 PROCEDURE — 1124F ACP DISCUSS-NO DSCNMKR DOCD: CPT | Performed by: INTERNAL MEDICINE

## 2025-06-30 PROCEDURE — 99213 OFFICE O/P EST LOW 20 MIN: CPT

## 2025-06-30 PROCEDURE — 83540 ASSAY OF IRON: CPT

## 2025-06-30 PROCEDURE — 82746 ASSAY OF FOLIC ACID SERUM: CPT

## 2025-06-30 PROCEDURE — 82728 ASSAY OF FERRITIN: CPT

## 2025-06-30 PROCEDURE — 83550 IRON BINDING TEST: CPT

## 2025-06-30 PROCEDURE — 1126F AMNT PAIN NOTED NONE PRSNT: CPT | Performed by: INTERNAL MEDICINE

## 2025-06-30 PROCEDURE — 36415 COLL VENOUS BLD VENIPUNCTURE: CPT

## 2025-06-30 NOTE — PROGRESS NOTES
[facility] Oncology & Hematology  [address] [place] [address] [place] [address]  Phone: [phone]  Fax: [phone]    Chanda Zavala, MS, RD, LD   Devin Shanthi 85 y.o.   Diagnosis, staging, date of diagnosis: recurrent invasive urothelial carcinoma, bladder, June 2025; adenocarcinoma of prostate, June 2025  Current Treatment: surveillance only; s/p cystectomy, prostatectomy, pelvic lymphadenectomy, ileal conduit, omental pedicle flap     Comprehensive Nutrition Assessment    Type and Reason for Visit:  Initial, Consult    Malnutrition Assessment:  Malnutrition Status:  At risk for malnutrition (06/30/25 1446)    Context:  Chronic Illness     Findings of the 6 clinical characteristics of malnutrition:  Energy Intake:  Mild decrease in energy intake  Weight Loss:  Greater than 10% over 6 months       Nutrition Assessment:   Referral received from Dr. Mamadou Azar MD for pt with dx invasive urothelial carcinoma and prostate adenocarcinoma. Pt presents nutritionally compromised AEB wt loss 7lbs over the past two weeks and reported poor energy intake. Pt endorses decreased appetite since surgery on 6/2/25. Pt denies constipation or diarrhea, but does note his bowel movements are not as regular as they were before his surgery.     Diet History:  Pt eats 2 meals most days and snacks in between. He most often eats Cherrios with whole milk for breakfast. He often drinks a Boost shake in the middle of the day. Occasionally, he will eat a half sandwich at lunchtime. Dinner meal varies, but he ate a grilled cheese last night. He drinks water and Gatorade throughout the day.     Nutrition Related Laboratory Data:  Na+=139  K+=3.9  Twy=931  BUN=36  Cr=1.3  GFR 54--hx CKD    Anthropometric Measures:  Height: 177.8 cm (5' 10\")  Ideal Body Weight (IBW): 166 lbs (75 kg)       Current Body Weight: 79 kg (174 lb 1.6 oz), 104.9 % IBW.    Current BMI (kg/m2): 25  Wt Readings from Last 5 Encounters:

## 2025-07-01 DIAGNOSIS — D50.8 IRON DEFICIENCY ANEMIA SECONDARY TO INADEQUATE DIETARY IRON INTAKE: Primary | ICD-10-CM

## 2025-07-01 NOTE — PROGRESS NOTES
Iron plan built at this time. Patient will go to outpatient infusion center for Iron replacement. I will call and notify patient. Electronically signed by Brittney Arroyo RN on 7/1/2025 at 7:24 AM

## 2025-07-02 ENCOUNTER — CLINICAL DOCUMENTATION (OUTPATIENT)
Dept: HEMATOLOGY | Age: 85
End: 2025-07-02

## 2025-07-02 PROBLEM — Z87.891 FORMER SMOKER: Status: ACTIVE | Noted: 2025-07-02

## 2025-07-02 RX ORDER — EPINEPHRINE 1 MG/ML
0.3 INJECTION, SOLUTION, CONCENTRATE INTRAVENOUS PRN
OUTPATIENT
Start: 2025-07-07

## 2025-07-02 RX ORDER — SODIUM CHLORIDE 9 MG/ML
INJECTION, SOLUTION INTRAVENOUS PRN
OUTPATIENT
Start: 2025-07-07

## 2025-07-02 RX ORDER — ACETAMINOPHEN 325 MG/1
650 TABLET ORAL
OUTPATIENT
Start: 2025-07-07

## 2025-07-02 RX ORDER — SODIUM CHLORIDE 9 MG/ML
5-250 INJECTION, SOLUTION INTRAVENOUS PRN
OUTPATIENT
Start: 2025-07-07

## 2025-07-02 RX ORDER — ONDANSETRON 2 MG/ML
8 INJECTION INTRAMUSCULAR; INTRAVENOUS
OUTPATIENT
Start: 2025-07-07

## 2025-07-02 RX ORDER — SODIUM CHLORIDE 0.9 % (FLUSH) 0.9 %
5-40 SYRINGE (ML) INJECTION PRN
OUTPATIENT
Start: 2025-07-07

## 2025-07-02 RX ORDER — ALBUTEROL SULFATE 90 UG/1
4 INHALANT RESPIRATORY (INHALATION) PRN
OUTPATIENT
Start: 2025-07-07

## 2025-07-02 RX ORDER — HEPARIN 100 UNIT/ML
500 SYRINGE INTRAVENOUS PRN
OUTPATIENT
Start: 2025-07-07

## 2025-07-02 RX ORDER — HYDROCORTISONE SODIUM SUCCINATE 100 MG/2ML
100 INJECTION INTRAMUSCULAR; INTRAVENOUS
OUTPATIENT
Start: 2025-07-07

## 2025-07-02 RX ORDER — DIPHENHYDRAMINE HYDROCHLORIDE 50 MG/ML
50 INJECTION, SOLUTION INTRAMUSCULAR; INTRAVENOUS
OUTPATIENT
Start: 2025-07-07

## 2025-07-02 NOTE — PROGRESS NOTES
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     Current packs/day: 0.00     Average packs/day: 1 pack/day for 7.0 years (7.0 ttl pk-yrs)     Types: Cigarettes     Start date:      Quit date:      Years since quittin.5    Smokeless tobacco: Never   Vaping Use    Vaping status: Never Used   Substance Use Topics    Alcohol use: No    Drug use: No     ALLERGIES  Lisinopril, Sulfamethoxazole-trimethoprim, Anacin [aspirin-caffeine], and Sulfa antibiotics     MEDICATIONS    Current Outpatient Medications:     aspirin 81 MG chewable tablet, Chew 1 tablet Daily., Disp: , Rfl:     atorvastatin (LIPITOR) 20 MG tablet, TAKE 1 TABLET BY MOUTH EVERY DAY, Disp: 90 tablet, Rfl: 3    cetirizine (zyrTEC) 10 MG tablet, Take 1 tablet by mouth Daily., Disp: , Rfl:     furosemide (LASIX) 40 MG tablet, Take 1 tablet by mouth Daily., Disp: , Rfl: 5    losartan (COZAAR) 100 MG tablet, Take 1 tablet by mouth Daily., Disp: , Rfl:     metoprolol succinate XL (TOPROL-XL) 25 MG 24 hr tablet, Take 1 tablet by mouth Daily. (Patient taking differently: Take 0.5 tablets by mouth Daily.), Disp: , Rfl: 3    nitroglycerin (NITROSTAT) 0.4 MG SL tablet, Place 1 tablet under the tongue  "Every 5 (Five) Minutes As Needed for Chest Pain. Take no more than 3 doses in 15 minutes., Disp: , Rfl:     pantoprazole (PROTONIX) 40 MG EC tablet, Take 1 tablet by mouth Daily., Disp: , Rfl:     amLODIPine (NORVASC) 5 MG tablet, Take 1 tablet by mouth Daily. (Patient not taking: Reported on 7/8/2025), Disp: , Rfl:     Current outpatient and discharge medications have been reconciled for the patient.  Reviewed by: Brian Patricia MD    The following portions of the patient's history were reviewed and updated as appropriate: allergies, current medications, past family history, past medical history, past social history, past surgical history and problem list.    REVIEW OF SYSTEMS  Review of Systems   Constitutional:  Positive for appetite change (decrease), fatigue and unexpected weight change (decrease of 20 lbs over last month).   HENT: Negative.     Eyes: Negative.         Glasses   Respiratory: Negative.     Cardiovascular: Negative.    Gastrointestinal: Negative.    Endocrine: Negative.    Genitourinary: Negative.         Urostomy bag in place with clear, yellow urine   Musculoskeletal:  Positive for gait problem (ambulating with cane).   Skin: Negative.    Allergic/Immunologic: Negative.    Neurological:  Positive for weakness (generalized).   Hematological: Negative.    Psychiatric/Behavioral: Negative.       Implant: NO    PHYSICAL EXAM  VITAL SIGNS:   Vitals:    07/08/25 1411   BP: 160/56   Weight: 78.5 kg (173 lb)   Height: 177.8 cm (70\")   PainSc: 0-No pain     Physical Exam  Vitals and nursing note reviewed. Exam conducted with a chaperone present.   Constitutional:       Appearance: He is normal weight.   HENT:      Head: Normocephalic.   Cardiovascular:      Rate and Rhythm: Normal rate and regular rhythm.   Pulmonary:      Effort: Pulmonary effort is normal.      Breath sounds: Normal breath sounds.   Abdominal:      General: Abdomen is protuberant. The ostomy site is clean. There is distension.      " Palpations: Abdomen is soft. There is no mass.      Tenderness: There is no abdominal tenderness.          Comments: Patient has a well-functioning ileal conduit ostomy bag that is clean and dry.   Musculoskeletal:         General: Normal range of motion.      Cervical back: Normal range of motion.      Right lower leg: No edema.      Left lower leg: No edema.   Lymphadenopathy:      Cervical: No cervical adenopathy.      Upper Body:      Right upper body: No supraclavicular or axillary adenopathy.      Left upper body: No supraclavicular or axillary adenopathy.   Neurological:      General: No focal deficit present.      Mental Status: He is alert and oriented to person, place, and time.   Psychiatric:         Mood and Affect: Mood normal.         Behavior: Behavior normal.         Thought Content: Thought content normal.         Judgment: Judgment normal.       Performance Status: ECOG (2) Ambulatory and capable of self care, unable to carry out work activity, up and about > 50% or waking hours    Clinical Quality Measures  -Pain Documented by Standardized Tool, FPS:  Jean Claude Peraza reports a pain score of 0.  Given his pain assessment as noted, treatment options were discussed and the following options were decided upon as a follow-up plan to address the patient's pain: N/A.    - Body Mass Index Screening and Follow-Up Plan  BMI is within normal parameters. No other follow-up for BMI required.    Tobacco Use: Screening and Cessation Intervention  Social History    Tobacco Use      Smoking status: Former        Packs/day: 0.00        Years: 1 pack/day for 7.0 years (7.0 ttl pk-yrs)        Types: Cigarettes        Start date:         Quit date: 1970        Years since quittin.5      Smokeless tobacco: Never    Advanced Care Planning: Advance Care Planning  ACP discussion was held with the patient during this visit. Patient does not have an advance directive, information provided.    - PHQ-2 Depression  Screening:  Little interest or pleasure in doing things? Not at all   Feeling down, depressed, or hopeless? Not at all   PHQ-2 Total Score 0     ASSESSMENT AND PLAN  1. Malignant neoplasm of urinary bladder, unspecified site    2. Former smoker      No orders of the defined types were placed in this encounter.    RECOMMENDATIONS: Jean Claude Peraza is a pleasant 85 y.o. 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  Patient is status post radical cystoprostatectomy with tumor invading the perivesical soft tissues microscopically with perineural/perivascular invasion.  Given patient's recurrence and tumor characteristics, along with his inability to receive systemic therapy, I agree with Dr. Jackson salvage radiation therapy to the pelvis and tumor bed is indicated.    Indications and rationale of radiation therapy according to the NCCN Guidelines to the bladder has been discussed with the patient today. I have extensively reviewed the risks, benefits and alternatives of therapy and progression of disease in spite of therapy with either local or systemic failure.  Side effects of radiation to the pelvic region include but are not limited to sunburn-type skin irritation of the targeted area (which may range from mild to  Intense, red, dry, tender, or itchy skin, general fatigue, diarrhea, rectal bleeding, hemorrhoids, vaginal itching, burning, and dryness for women, Incontinence of bowel or bladder, bladder irritation and sexual problems.    I discussed the goals and plans of care with the patient and family and answered all questions. In consideration of the diagnostic data and evaluation of the patient, it is my recommendation that this patient be treated with a course of radiation therapy.  We will plan for a dose of 5040 cGy to the bladder and pelvic lymph nodes in the first 28 fractions, then a total of 7040cGy over an  additional 10 fractions to the bladder without systemic therapy due to advanced age and comorbidities.    The patient verbalizes understanding of this discussion, voices no further questions and wishes to proceed with recommended therapy. We will perform CT simulation next week to initiate the treatment planning, final dose pending treatment planning.    Thank you for allowing me to assist in this patients care, he will continue ongoing management per primary care physician and other specialists.    Return in 1 week (on 7/15/2025) for CT Simulation.    Time Spent: I spent 90 minutes caring for Jean Claude on this date of service. This time includes time spent by me in the following activities: preparing for the visit, reviewing tests, obtaining and/or reviewing a separately obtained history, performing a medically appropriate examination and/or evaluation, counseling and educating the patient/family/caregiver, ordering medications, tests, or procedures, and documenting information in the medical record.   Brian Patricia MD  07/08/2025

## 2025-07-02 NOTE — PROGRESS NOTES
Patients wife notified about labs and Iron infusion plan. She was advised that once approved by insurance a staff member will call them to schedule. She was informed that infusion will be administered at the outpatient infusion center at the hospital. She verbalized understanding. Electronically signed by Brittney Arroyo RN on 7/2/2025 at 8:05 AM

## 2025-07-07 ENCOUNTER — HOSPITAL ENCOUNTER (OUTPATIENT)
Dept: RADIATION ONCOLOGY | Facility: HOSPITAL | Age: 85
Setting detail: RADIATION/ONCOLOGY SERIES
End: 2025-07-07
Payer: MEDICARE

## 2025-07-08 ENCOUNTER — CONSULT (OUTPATIENT)
Age: 85
End: 2025-07-08
Payer: MEDICARE

## 2025-07-08 VITALS
BODY MASS INDEX: 24.77 KG/M2 | HEIGHT: 70 IN | WEIGHT: 173 LBS | DIASTOLIC BLOOD PRESSURE: 56 MMHG | SYSTOLIC BLOOD PRESSURE: 160 MMHG

## 2025-07-08 DIAGNOSIS — Z87.891 FORMER SMOKER: ICD-10-CM

## 2025-07-08 DIAGNOSIS — C67.9 MALIGNANT NEOPLASM OF URINARY BLADDER, UNSPECIFIED SITE: Primary | ICD-10-CM

## 2025-07-08 PROCEDURE — G0463 HOSPITAL OUTPT CLINIC VISIT: HCPCS | Performed by: RADIOLOGY

## 2025-07-09 ENCOUNTER — HOSPITAL ENCOUNTER (OUTPATIENT)
Dept: WOUND CARE | Age: 85
Discharge: HOME | End: 2025-07-09
Attending: SURGERY
Payer: MEDICARE

## 2025-07-09 VITALS
TEMPERATURE: 97.6 F | DIASTOLIC BLOOD PRESSURE: 61 MMHG | RESPIRATION RATE: 18 BRPM | HEART RATE: 58 BPM | SYSTOLIC BLOOD PRESSURE: 142 MMHG

## 2025-07-09 PROCEDURE — 99212 OFFICE O/P EST SF 10 MIN: CPT

## 2025-07-09 PROCEDURE — 77263 THER RADIOLOGY TX PLNG CPLX: CPT | Performed by: RADIOLOGY

## 2025-07-09 NOTE — DISCHARGE INSTRUCTIONS
[facility] Wound Care and Hyperbaric Oxygen Therapy   Physician Orders and Discharge Instructions  [address] [place] [address]  [address]  [place] [address]  Telephone: [phone]      FAX [phone]    NAME:  Devin Maki  YOB: 1940  MEDICAL RECORD NUMBER:  [id number]  DATE:  7/9/2025    Discharge condition: Stable    Discharge to: Home    Left via:Private automobile    Accompanied by:  daughter    ECF/HHA:   Coloplast 2-piece Custom      Change Pouch and Wafer: Every 3-5 days and as needed for leaking   Supplies:       Brava protective Seal thin #00217  Brava Powder #49596  Brava Elastic Barrier Strips #[id number]  3M Cavilon No Sting Skin Barrier #3343  Esenta Sting Free Adhesive Remover Spray #Convatec 284640  Coloplast #71192 urostomy pouch  Wafer # 26937 light convex wafer    Gather supplies needed to change pouch and wafer.     Gently remove your old pouch     Look at the back of pouch before throwing away to see how it has worn.    Wash the skin around your stoma with water. Pat completely dry.    Use the measuring guide to measure your stoma size or use the old pattern to trace correct size opening on plastic backing of wafer.     Cut out the opening and remove the plastic backing from the wafer.      Optional - Apply Protective Barrier to skin around the stoma or to the back of the wafer.  If skin irritated dust with stoma powder, dust off the excess, then apply no sting skin protectant over the powdered areas    Apply Barrier Ring (gasket) to the opening of the wafer, then center your wafer by centering the opening of the wafer around the stoma and press the wafer down firmly.      Be sure to roll the end up 3 times and flip the tabs over to secure.    Lay quietly for 15-20 minutes to allow the adhesives to mold to your skin.    Empty Pouch    Empty the pouch when it’s 1/3 to 1/2 full of gas or stool     Wipe out tail-end of pouch with toilet tissue.      Close the end of the pouch

## 2025-07-09 NOTE — PATIENT INSTRUCTIONS
[facility] Wound Care and Hyperbaric Oxygen Therapy   Physician Orders and Discharge Instructions  [address] [place] [address]  [address]  [place] [address]  Telephone: [phone]      FAX [phone]    NAME:  Devin Maki  YOB: 1940  MEDICAL RECORD NUMBER:  [id number]  DATE:  7/9/2025    Discharge condition: Stable    Discharge to: Home    Left via:Private automobile    Accompanied by:  daughter    ECF/HHA:   Coloplast 2-piece Custom      Change Pouch and Wafer: Every 3-5 days and as needed for leaking   Supplies:       Brava protective Seal thin #38409  Brava Powder #11832  Brava Elastic Barrier Strips #[id number]  3M Cavilon No Sting Skin Barrier #3343  Esenta Sting Free Adhesive Remover Spray #Convatec [id number]  Coloplast #55768 urostomy pouch  Wafer # 84739 light convex wafer    Gather supplies needed to change pouch and wafer.     Gently remove your old pouch     Look at the back of pouch before throwing away to see how it has worn.    Wash the skin around your stoma with water. Pat completely dry.    Use the measuring guide to measure your stoma size or use the old pattern to trace correct size opening on plastic backing of wafer.     Cut out the opening and remove the plastic backing from the wafer.      Optional - Apply Protective Barrier to skin around the stoma or to the back of the wafer.  If skin irritated dust with stoma powder, dust off the excess, then apply no sting skin protectant over the powdered areas    Apply Barrier Ring (gasket) to the opening of the wafer, then center your wafer by centering the opening of the wafer around the stoma and press the wafer down firmly.      Be sure to roll the end up 3 times and flip the tabs over to secure.    Lay quietly for 15-20 minutes to allow the adhesives to mold to your skin.    Empty Pouch    Empty the pouch when it’s 1/3 to 1/2 full of gas or stool     Wipe out tail-end of pouch with toilet tissue.      Close the end of the

## 2025-07-09 NOTE — WOUND CARE
Clinical Level of Care Assessment    Outpatient Ostomy Care      NAME:  Devin Maki  YOB: 1940  MEDICAL RECORD NUMBER:  [id number]   DATE:  7/9/2025      Patient /Ostomy Assessment- Document in Flowsheet I&O   Points   Review of chart []   0   Assess Complete Ostomy tab in Navigator for assessment of; stoma status, peristomal skin, presence of hernia/stool consistency/diet/related medications   Simple adjustments to pouch size/pouch system, new stoma pattern, accessory addition/deletion.   []   1   Assess Complete Ostomy tab in Navigator for assessment of; stoma status, peristomal skin, presence of hernia/stool consistency/diet/related medications   Moderate adjustments to pouch size/pouch system, new stoma pattern, accessory addition/deletion.  Observe patient/caregiver with hands-on care.   1-2 adjustments to pouch size/system/skin care/accessory addition or deletion.    [x]   2   Assess Complete Ostomy tab in Navigator for assessment of; stoma status, peristomal skin, presence of hernia/stool consistency/diet/related medications   Complex adjustments to pouch size/pouch system, new stoma pattern, accessory addition/deletion.  3 or more complex adjustments to pouch size/system/skin care/accessory addition or deletion.  Observe patient/caregiver with hands-on care.   Assess patient/patient abdomen for optimal pre-marked stoma site.  Assess patient abdomen for type of hernia belt/accessory needed. []   3         Ambulation Status Documented in CN Clinical Note  Status Definition Points   Independent Independently able to ambulate.  Fully able (without any assistance) to get on/off exam table/chair.    []   0   Minimal Physical Assistance Requires physical assistance of one person to ambulate and/or position patient to be examined. Includes necessary physical assistance to position lower extremities on/off stool.   [x]   1   Moderate Physical Assistance Requires at least one staff member to physically 
Current pouching system     Current Diet: No diet orders on file  Dietician consult:  N/A      Referrals:  []   [] Home Health Care  [] Supplies  [x] Other Stoma Genie - will call to see if samples can be sent to patirnt      Patient/Caregiver Teaching:  Written Instructions given to patient/family see patient instructions  Teaching provided:  [x] Reviewed GI and A&P        [] Supplies  [x] Pouch emptying      [] Manipulate closure  [x] Routine Care         [] Comment  [x] Pouch maintenance           Level of patient/caregiver understanding able to:  [x] Indicates understanding       [x] Needs reinforcement  [] Unsuccessful      [x] Verbal Understanding  [] Demonstrated understanding       [] No evidence of learning  [] Refused teaching         [] N/A      Follow up: 1 week    Please see attached Discharge Instructions    Written patient dismissal instructions given to patient and signed by patient or POA.         I spent 45 minutes providing ostomy evaluation, education and follow-up care.    Electronically signed by Cynthia Schmidt RN on 7/9/2025 at 1:28 PM

## 2025-07-14 ENCOUNTER — HOSPITAL ENCOUNTER (OUTPATIENT)
Dept: WOUND CARE | Age: 85
Discharge: HOME | End: 2025-07-14
Attending: SURGERY
Payer: MEDICARE

## 2025-07-14 ENCOUNTER — HOSPITAL ENCOUNTER (OUTPATIENT)
Dept: INFUSION THERAPY | Age: 85
Setting detail: INFUSION SERIES
Discharge: HOME | End: 2025-07-14
Payer: MEDICARE

## 2025-07-14 VITALS
SYSTOLIC BLOOD PRESSURE: 116 MMHG | HEART RATE: 48 BPM | RESPIRATION RATE: 18 BRPM | OXYGEN SATURATION: 100 % | TEMPERATURE: 97.5 F | DIASTOLIC BLOOD PRESSURE: 43 MMHG

## 2025-07-14 VITALS
DIASTOLIC BLOOD PRESSURE: 54 MMHG | SYSTOLIC BLOOD PRESSURE: 114 MMHG | BODY MASS INDEX: 24.92 KG/M2 | HEART RATE: 53 BPM | WEIGHT: 174.1 LBS | TEMPERATURE: 96.2 F | HEIGHT: 70 IN | RESPIRATION RATE: 18 BRPM

## 2025-07-14 DIAGNOSIS — D50.8 IRON DEFICIENCY ANEMIA SECONDARY TO INADEQUATE DIETARY IRON INTAKE: Primary | ICD-10-CM

## 2025-07-14 PROCEDURE — 96366 THER/PROPH/DIAG IV INF ADDON: CPT

## 2025-07-14 PROCEDURE — 6360000002 HC RX W HCPCS: Performed by: INTERNAL MEDICINE

## 2025-07-14 PROCEDURE — 99212 OFFICE O/P EST SF 10 MIN: CPT

## 2025-07-14 PROCEDURE — 96365 THER/PROPH/DIAG IV INF INIT: CPT

## 2025-07-14 PROCEDURE — 2580000003 HC RX 258: Performed by: INTERNAL MEDICINE

## 2025-07-14 RX ORDER — SODIUM CHLORIDE 0.9 % (FLUSH) 0.9 %
5-40 SYRINGE (ML) INJECTION PRN
Status: DISCONTINUED | OUTPATIENT
Start: 2025-07-14 | End: 2025-07-15 | Stop reason: HOSPADM

## 2025-07-14 RX ORDER — SODIUM CHLORIDE 0.9 % (FLUSH) 0.9 %
5-40 SYRINGE (ML) INJECTION PRN
Status: CANCELLED | OUTPATIENT
Start: 2025-07-28

## 2025-07-14 RX ORDER — SODIUM CHLORIDE 9 MG/ML
5-250 INJECTION, SOLUTION INTRAVENOUS PRN
OUTPATIENT
Start: 2025-07-28

## 2025-07-14 RX ORDER — SODIUM CHLORIDE 9 MG/ML
INJECTION, SOLUTION INTRAVENOUS PRN
OUTPATIENT
Start: 2025-07-28

## 2025-07-14 RX ORDER — ONDANSETRON 2 MG/ML
8 INJECTION INTRAMUSCULAR; INTRAVENOUS
OUTPATIENT
Start: 2025-07-28

## 2025-07-14 RX ORDER — DIPHENHYDRAMINE HYDROCHLORIDE 50 MG/ML
50 INJECTION, SOLUTION INTRAMUSCULAR; INTRAVENOUS
OUTPATIENT
Start: 2025-07-28

## 2025-07-14 RX ORDER — ACETAMINOPHEN 325 MG/1
650 TABLET ORAL
OUTPATIENT
Start: 2025-07-28

## 2025-07-14 RX ORDER — ALBUTEROL SULFATE 90 UG/1
4 INHALANT RESPIRATORY (INHALATION) PRN
OUTPATIENT
Start: 2025-07-28

## 2025-07-14 RX ORDER — HEPARIN 100 UNIT/ML
500 SYRINGE INTRAVENOUS PRN
OUTPATIENT
Start: 2025-07-28

## 2025-07-14 RX ORDER — SODIUM CHLORIDE 9 MG/ML
5-250 INJECTION, SOLUTION INTRAVENOUS PRN
Status: CANCELLED | OUTPATIENT
Start: 2025-07-28

## 2025-07-14 RX ORDER — HYDROCORTISONE SODIUM SUCCINATE 100 MG/2ML
100 INJECTION INTRAMUSCULAR; INTRAVENOUS
OUTPATIENT
Start: 2025-07-28

## 2025-07-14 RX ORDER — EPINEPHRINE 1 MG/ML
0.3 INJECTION, SOLUTION, CONCENTRATE INTRAVENOUS PRN
OUTPATIENT
Start: 2025-07-28

## 2025-07-14 RX ORDER — SODIUM CHLORIDE 9 MG/ML
5-250 INJECTION, SOLUTION INTRAVENOUS PRN
Status: DISCONTINUED | OUTPATIENT
Start: 2025-07-14 | End: 2025-07-15 | Stop reason: HOSPADM

## 2025-07-14 RX ADMIN — IRON SUCROSE 300 MG: 20 INJECTION, SOLUTION INTRAVENOUS at 12:01

## 2025-07-14 NOTE — WOUND CARE
Clinical Level of Care Assessment    Outpatient Ostomy Care      NAME:  Devin Maki  YOB: 1940  MEDICAL RECORD NUMBER:  [id number]   DATE:  7/14/2025      Patient /Ostomy Assessment- Document in Flowsheet I&O   Points   Review of chart [x]   0   Assess Complete Ostomy tab in Navigator for assessment of; stoma status, peristomal skin, presence of hernia/stool consistency/diet/related medications   Simple adjustments to pouch size/pouch system, new stoma pattern, accessory addition/deletion.   [x]   1   Assess Complete Ostomy tab in Navigator for assessment of; stoma status, peristomal skin, presence of hernia/stool consistency/diet/related medications   Moderate adjustments to pouch size/pouch system, new stoma pattern, accessory addition/deletion.  Observe patient/caregiver with hands-on care.   1-2 adjustments to pouch size/system/skin care/accessory addition or deletion.    []   2   Assess Complete Ostomy tab in Navigator for assessment of; stoma status, peristomal skin, presence of hernia/stool consistency/diet/related medications   Complex adjustments to pouch size/pouch system, new stoma pattern, accessory addition/deletion.  3 or more complex adjustments to pouch size/system/skin care/accessory addition or deletion.  Observe patient/caregiver with hands-on care.   Assess patient/patient abdomen for optimal pre-marked stoma site.  Assess patient abdomen for type of hernia belt/accessory needed. []   3         Ambulation Status Documented in CN Clinical Note  Status Definition Points   Independent Independently able to ambulate.  Fully able (without any assistance) to get on/off exam table/chair.    []   0   Minimal Physical Assistance Requires physical assistance of one person to ambulate and/or position patient to be examined. Includes necessary physical assistance to position lower extremities on/off stool.   [x]   1   Moderate Physical Assistance Requires at least one staff member to

## 2025-07-14 NOTE — PATIENT INSTRUCTIONS
[facility] Wound Care and Hyperbaric Oxygen Therapy   Physician Orders and Discharge Instructions  [address] [place] [address]  [address]  [place] [address]  Telephone: [phone]      FAX [phone]    NAME:  Devin Maki  YOB: 1940  MEDICAL RECORD NUMBER:  [id number]  DATE:  7/14/2025    Discharge condition: Stable    Discharge to: Home    Left via:Private automobile    Accompanied by:  daughter    ECF/HHA: [employer]        Coloplast 2-piece Custom      Change Pouch and Wafer: Every 3-5 days and as needed for leaking   Supplies:       Brava protective Seal thin #26550  Brava Powder #92216  Brava Elastic Barrier Strips #[id number]  3M Cavilon No Sting Skin Barrier #3343  Esenta Sting Free Adhesive Remover Spray #Convatec [id number]  Coloplast #44466 urostomy pouch  Wafer # 78319 light convex wafer    Will order some samples of the Stoma Genie ostomy output capture cartridge [email or web address] [email or web address] for patient to trial.      Gather supplies needed to change pouch and wafer.     Gently remove your old pouch     Look at the back of pouch before throwing away to see how it has worn.    Wash the skin around your stoma with water. Pat completely dry.    Use the measuring guide to measure your stoma size or use the old pattern to trace correct size opening on plastic backing of wafer.     Cut out the opening and remove the plastic backing from the wafer.      Optional - Apply Protective Barrier to skin around the stoma or to the back of the wafer.  If skin irritated dust with stoma powder, dust off the excess, then apply no sting skin protectant over the powdered areas    Apply Barrier Ring (gasket) to the opening of the wafer, then center your wafer by centering the opening of the wafer around the stoma and press the wafer down firmly.      Be sure to roll the end up 3 times and flip the tabs over to secure.    Lay quietly for 15-20 minutes to allow the adhesives to mold to your skin.    Empty

## 2025-07-14 NOTE — PROGRESS NOTES
PIV inserted and 300 mg venofer given as ordered. Pt monitored for 30 mins post infusion. Pt tolerated well.

## 2025-07-14 NOTE — WOUND CARE
[facility] Outpatient   Ostomy Note      NAME:  Devin Maki  MEDICAL RECORD NUMBER:  [id number]  AGE: 85 y.o.   GENDER:  male  :  1940  TODAY'S DATE:  2025    Subjective       Chief Complaint   Patient presents with    Wound Check     Urostomy care         HISTORY of PRESENT ILLNESS HPI    Devin Maki is a 85 y.o. male Established patient , who presents today for ostomy/stoma evaluation.   History of Ostomy Context:  25 Robotic assisted laparoscopic radical cystectomy, prostatectomy, positive () pelvic lymphadenectomy, intracorporeal ileal conduit, omental pedicle flap by Dr Anthony Jackman/[facility]   Wound/Ulcer Pain Timing/Severity: none  Quality of pain: N/A  Severity:  0 / 10   Associated Signs/Symptoms: none    PAST MEDICAL HISTORY        Diagnosis Date    Allergic rhinitis     Bladder cancer (HCC)     CAD (coronary artery disease)     Colon polyps     Gastritis     Hiatal hernia     Hyperlipidemia     Hypertension        PAST SURGICAL HISTORY    Past Surgical History:   Procedure Laterality Date    BLADDER SURGERY  2025    bladder removed    COLONOSCOPY  2011    Dr. Torres    COLONOSCOPY  2007    Dr. Torres--colon polyps    COLONOSCOPY N/A 2016    Dr Gauthier-Tubular AP x 1, dysplasia (-), HP x 3, 5 yr recall    CORONARY ARTERY BYPASS GRAFT  2001    stents    CORONARY ARTERY BYPASS GRAFT  2000    UPPER GASTROINTESTINAL ENDOSCOPY  2011    [facility]    VASCULAR SURGERY      carotid artery cleaned out       FAMILY HISTORY    Family History   Problem Relation Age of Onset    Cancer Mother         tongue cancer    Heart Disease Father     Stomach Cancer Sister     Prostate Cancer Brother     Unknown Maternal Grandmother     Unknown Maternal Grandfather     Unknown Paternal Grandmother     Unknown Paternal Grandfather     Colon Cancer Neg Hx     Colon Polyps Neg Hx     Esophageal Cancer Neg Hx     Liver Disease Neg Hx     Liver Cancer

## 2025-07-15 PROCEDURE — 77334 RADIATION TREATMENT AID(S): CPT | Performed by: RADIOLOGY

## 2025-07-21 ENCOUNTER — HOSPITAL ENCOUNTER (OUTPATIENT)
Dept: INFUSION THERAPY | Age: 85
Setting detail: INFUSION SERIES
Discharge: HOME | End: 2025-07-21
Payer: MEDICARE

## 2025-07-21 VITALS
TEMPERATURE: 97.7 F | RESPIRATION RATE: 16 BRPM | SYSTOLIC BLOOD PRESSURE: 97 MMHG | OXYGEN SATURATION: 99 % | DIASTOLIC BLOOD PRESSURE: 54 MMHG | HEART RATE: 70 BPM

## 2025-07-21 DIAGNOSIS — D50.8 IRON DEFICIENCY ANEMIA SECONDARY TO INADEQUATE DIETARY IRON INTAKE: Primary | ICD-10-CM

## 2025-07-21 PROCEDURE — 2500000003 HC RX 250 WO HCPCS: Performed by: INTERNAL MEDICINE

## 2025-07-21 PROCEDURE — 77301 RADIOTHERAPY DOSE PLAN IMRT: CPT | Performed by: RADIOLOGY

## 2025-07-21 PROCEDURE — 77300 RADIATION THERAPY DOSE PLAN: CPT | Performed by: RADIOLOGY

## 2025-07-21 PROCEDURE — 77338 DESIGN MLC DEVICE FOR IMRT: CPT | Performed by: RADIOLOGY

## 2025-07-21 PROCEDURE — 96365 THER/PROPH/DIAG IV INF INIT: CPT

## 2025-07-21 PROCEDURE — 6360000002 HC RX W HCPCS: Performed by: INTERNAL MEDICINE

## 2025-07-21 PROCEDURE — 96366 THER/PROPH/DIAG IV INF ADDON: CPT

## 2025-07-21 PROCEDURE — 2580000003 HC RX 258: Performed by: INTERNAL MEDICINE

## 2025-07-21 RX ORDER — DIPHENHYDRAMINE HYDROCHLORIDE 50 MG/ML
50 INJECTION, SOLUTION INTRAMUSCULAR; INTRAVENOUS
OUTPATIENT
Start: 2025-07-28

## 2025-07-21 RX ORDER — SODIUM CHLORIDE 9 MG/ML
5-250 INJECTION, SOLUTION INTRAVENOUS PRN
Status: CANCELLED | OUTPATIENT
Start: 2025-07-28

## 2025-07-21 RX ORDER — ONDANSETRON 2 MG/ML
8 INJECTION INTRAMUSCULAR; INTRAVENOUS
OUTPATIENT
Start: 2025-07-28

## 2025-07-21 RX ORDER — SODIUM CHLORIDE 9 MG/ML
5-250 INJECTION, SOLUTION INTRAVENOUS PRN
Status: DISCONTINUED | OUTPATIENT
Start: 2025-07-21 | End: 2025-07-22 | Stop reason: HOSPADM

## 2025-07-21 RX ORDER — SODIUM CHLORIDE 9 MG/ML
INJECTION, SOLUTION INTRAVENOUS PRN
OUTPATIENT
Start: 2025-07-28

## 2025-07-21 RX ORDER — SODIUM CHLORIDE 0.9 % (FLUSH) 0.9 %
5-40 SYRINGE (ML) INJECTION PRN
Status: CANCELLED | OUTPATIENT
Start: 2025-07-28

## 2025-07-21 RX ORDER — ACETAMINOPHEN 325 MG/1
650 TABLET ORAL
OUTPATIENT
Start: 2025-07-28

## 2025-07-21 RX ORDER — EPINEPHRINE 1 MG/ML
0.3 INJECTION, SOLUTION, CONCENTRATE INTRAVENOUS PRN
OUTPATIENT
Start: 2025-07-28

## 2025-07-21 RX ORDER — HYDROCORTISONE SODIUM SUCCINATE 100 MG/2ML
100 INJECTION INTRAMUSCULAR; INTRAVENOUS
OUTPATIENT
Start: 2025-07-28

## 2025-07-21 RX ORDER — ALBUTEROL SULFATE 90 UG/1
4 INHALANT RESPIRATORY (INHALATION) PRN
OUTPATIENT
Start: 2025-07-28

## 2025-07-21 RX ORDER — HEPARIN 100 UNIT/ML
500 SYRINGE INTRAVENOUS PRN
OUTPATIENT
Start: 2025-07-28

## 2025-07-21 RX ORDER — SODIUM CHLORIDE 0.9 % (FLUSH) 0.9 %
5-40 SYRINGE (ML) INJECTION PRN
Status: DISCONTINUED | OUTPATIENT
Start: 2025-07-21 | End: 2025-07-22 | Stop reason: HOSPADM

## 2025-07-21 RX ORDER — SODIUM CHLORIDE 9 MG/ML
5-250 INJECTION, SOLUTION INTRAVENOUS PRN
OUTPATIENT
Start: 2025-07-28

## 2025-07-21 RX ADMIN — IRON SUCROSE 300 MG: 20 INJECTION, SOLUTION INTRAVENOUS at 12:11

## 2025-07-21 RX ADMIN — SODIUM CHLORIDE, PRESERVATIVE FREE 10 ML: 5 INJECTION INTRAVENOUS at 14:14

## 2025-07-24 ENCOUNTER — HOSPITAL ENCOUNTER (OUTPATIENT)
Dept: PHYSICAL THERAPY | Age: 85
Setting detail: THERAPIES SERIES
Discharge: HOME | End: 2025-07-24
Payer: MEDICARE

## 2025-07-24 ENCOUNTER — HOSPITAL ENCOUNTER (OUTPATIENT)
Dept: RADIATION ONCOLOGY | Facility: HOSPITAL | Age: 85
Discharge: HOME | End: 2025-07-24

## 2025-07-24 ENCOUNTER — DOCUMENTATION (OUTPATIENT)
Dept: RADIATION ONCOLOGY | Facility: HOSPITAL | Age: 85
End: 2025-07-24
Payer: MEDICARE

## 2025-07-24 PROCEDURE — 97162 PT EVAL MOD COMPLEX 30 MIN: CPT

## 2025-07-24 PROCEDURE — 77427 RADIATION TX MANAGEMENT X5: CPT | Performed by: RADIOLOGY

## 2025-07-24 PROCEDURE — 77014 CHG CT GUIDANCE RADIATION THERAPY FLDS PLACEMENT: CPT | Performed by: RADIOLOGY

## 2025-07-24 PROCEDURE — 77386: CPT | Performed by: RADIOLOGY

## 2025-07-24 PROCEDURE — 77385: CPT | Performed by: RADIOLOGY

## 2025-07-24 NOTE — PROGRESS NOTES
BRENDA met with Mr. Peraza who is here to start radiation treatment. BRENDA introduced self and explained role and source of support. He is 85 years old and lives with his wife. They have been  for 61 years. His support system includes his wife and two daughters. Currently, he does not have transportation or financial concerns.   He does not take any medication for anxiety/depression and he does not see a counselor. Mr. Peraza states he was nervous about radiation. Emotional support provided. He has trouble sleeping and states it has been going on for about two months. He will take naps, if needed but tries to stay up throughout the day. His coping strategies include going to his shop. No needs noted. BRENDA will remain available as needed.

## 2025-07-24 NOTE — PROGRESS NOTES
Physical Therapy: Initial Evaluation    Patient: Devin Maki (85 y.o. male)   Examination Date: 2025  Plan of Care Certification Period:2025 to        :  1940 ;    Confirmed: Yes MRN: [id number]  CSN: [id number]   Insurance: Payor: [employer] / Plan: [employer] MCR / Product Type: *No Product type* /   Insurance ID: [id number] - (Medicare Managed) Secondary Insurance (if applicable):    Referring Physician: Mamadou Azar MD Wederson Claudino, MD   PCP: Ezekiel Crum MD Visits to Date/Visits Approved:   (12-16 visits anticipated for this diagnosis)    No Show/Cancelled Appts:   /       Medical Diagnosis: Other malaise [R53.81]  Weakness [R53.1] physical deconditioning (R53.81), weakness generalized (R53.1)  Treatment Diagnosis:       PERTINENT MEDICAL HISTORY   Patient Assessed for Rehabilitation Services: Yes       Medical History: Chart Reviewed: Yes    Past Medical History:   Diagnosis Date    Allergic rhinitis     Bladder cancer (HCC)     CAD (coronary artery disease)     Colon polyps     Gastritis     Hiatal hernia     Hyperlipidemia     Hypertension      Surgical History:   Past Surgical History:   Procedure Laterality Date    BLADDER SURGERY  2025    bladder removed    COLONOSCOPY  2011    Dr. Torres    COLONOSCOPY  2007    Dr. Torres--colon polyps    COLONOSCOPY N/A 2016    Dr Gauthier-Tubular AP x 1, dysplasia (-), HP x 3, 5 yr recall    CORONARY ARTERY BYPASS GRAFT  2001    stents    CORONARY ARTERY BYPASS GRAFT  2000    UPPER GASTROINTESTINAL ENDOSCOPY  2011    [facility]    VASCULAR SURGERY      carotid artery cleaned out       Medications:   Current Outpatient Medications:     acetaminophen (TYLENOL) 325 MG tablet, Take 2 tablets by mouth every 6 hours as needed for Pain, Disp: , Rfl:     ELIQUIS 2.5 MG TABS tablet, Take 1 tablet by mouth 2 times daily (Patient not taking: Reported on 2025), Disp: , Rfl:

## 2025-07-25 ENCOUNTER — HOSPITAL ENCOUNTER (OUTPATIENT)
Dept: RADIATION ONCOLOGY | Facility: HOSPITAL | Age: 85
Discharge: HOME | End: 2025-07-25

## 2025-07-25 LAB
RAD ONC ARIA COURSE ID: NORMAL
RAD ONC ARIA COURSE INTENT: NORMAL
RAD ONC ARIA COURSE LAST TREATMENT DATE: NORMAL
RAD ONC ARIA COURSE START DATE: NORMAL
RAD ONC ARIA COURSE TREATMENT ELAPSED DAYS: 1
RAD ONC ARIA FIRST TREATMENT DATE: NORMAL
RAD ONC ARIA PLAN FRACTIONS TREATED TO DATE: 2
RAD ONC ARIA PLAN ID: NORMAL
RAD ONC ARIA PLAN PRESCRIBED DOSE PER FRACTION: 1.8 GY
RAD ONC ARIA PLAN PRIMARY REFERENCE POINT: NORMAL
RAD ONC ARIA PLAN TOTAL FRACTIONS PRESCRIBED: 28
RAD ONC ARIA PLAN TOTAL PRESCRIBED DOSE: 5040 CGY
RAD ONC ARIA REFERENCE POINT DOSAGE GIVEN TO DATE: 3.6 GY
RAD ONC ARIA REFERENCE POINT ID: NORMAL
RAD ONC ARIA REFERENCE POINT SESSION DOSAGE GIVEN: 1.8 GY

## 2025-07-25 PROCEDURE — 77386: CPT | Performed by: RADIOLOGY

## 2025-07-25 PROCEDURE — 77014 CHG CT GUIDANCE RADIATION THERAPY FLDS PLACEMENT: CPT | Performed by: RADIOLOGY

## 2025-07-28 ENCOUNTER — HOSPITAL ENCOUNTER (OUTPATIENT)
Dept: RADIATION ONCOLOGY | Facility: HOSPITAL | Age: 85
Discharge: HOME | End: 2025-07-28
Payer: MEDICARE

## 2025-07-28 ENCOUNTER — HOSPITAL ENCOUNTER (OUTPATIENT)
Dept: INFUSION THERAPY | Age: 85
Setting detail: INFUSION SERIES
Discharge: HOME | End: 2025-07-28
Payer: MEDICARE

## 2025-07-28 ENCOUNTER — HOSPITAL ENCOUNTER (OUTPATIENT)
Dept: WOUND CARE | Age: 85
Discharge: HOME | End: 2025-07-28
Attending: SURGERY
Payer: MEDICARE

## 2025-07-28 VITALS
SYSTOLIC BLOOD PRESSURE: 141 MMHG | TEMPERATURE: 97.6 F | RESPIRATION RATE: 18 BRPM | HEART RATE: 64 BPM | OXYGEN SATURATION: 100 % | DIASTOLIC BLOOD PRESSURE: 66 MMHG

## 2025-07-28 VITALS
HEART RATE: 50 BPM | BODY MASS INDEX: 24.92 KG/M2 | SYSTOLIC BLOOD PRESSURE: 132 MMHG | WEIGHT: 174.1 LBS | RESPIRATION RATE: 16 BRPM | TEMPERATURE: 97.8 F | HEIGHT: 70 IN | DIASTOLIC BLOOD PRESSURE: 58 MMHG

## 2025-07-28 DIAGNOSIS — D50.8 IRON DEFICIENCY ANEMIA SECONDARY TO INADEQUATE DIETARY IRON INTAKE: Primary | ICD-10-CM

## 2025-07-28 LAB
RAD ONC ARIA COURSE ID: NORMAL
RAD ONC ARIA COURSE INTENT: NORMAL
RAD ONC ARIA COURSE LAST TREATMENT DATE: NORMAL
RAD ONC ARIA COURSE START DATE: NORMAL
RAD ONC ARIA COURSE TREATMENT ELAPSED DAYS: 4
RAD ONC ARIA FIRST TREATMENT DATE: NORMAL
RAD ONC ARIA PLAN FRACTIONS TREATED TO DATE: 3
RAD ONC ARIA PLAN ID: NORMAL
RAD ONC ARIA PLAN PRESCRIBED DOSE PER FRACTION: 1.8 GY
RAD ONC ARIA PLAN PRIMARY REFERENCE POINT: NORMAL
RAD ONC ARIA PLAN TOTAL FRACTIONS PRESCRIBED: 28
RAD ONC ARIA PLAN TOTAL PRESCRIBED DOSE: 5040 CGY
RAD ONC ARIA REFERENCE POINT DOSAGE GIVEN TO DATE: 5.4 GY
RAD ONC ARIA REFERENCE POINT ID: NORMAL
RAD ONC ARIA REFERENCE POINT SESSION DOSAGE GIVEN: 1.8 GY

## 2025-07-28 PROCEDURE — 77386: CPT | Performed by: RADIOLOGY

## 2025-07-28 PROCEDURE — 96365 THER/PROPH/DIAG IV INF INIT: CPT

## 2025-07-28 PROCEDURE — 2580000003 HC RX 258: Performed by: INTERNAL MEDICINE

## 2025-07-28 PROCEDURE — 77014 CHG CT GUIDANCE RADIATION THERAPY FLDS PLACEMENT: CPT | Performed by: RADIOLOGY

## 2025-07-28 PROCEDURE — 96366 THER/PROPH/DIAG IV INF ADDON: CPT

## 2025-07-28 PROCEDURE — 6360000002 HC RX W HCPCS: Performed by: INTERNAL MEDICINE

## 2025-07-28 PROCEDURE — 99212 OFFICE O/P EST SF 10 MIN: CPT

## 2025-07-28 RX ORDER — GENTAMICIN SULFATE 1 MG/G
OINTMENT TOPICAL PRN
OUTPATIENT
Start: 2025-07-28

## 2025-07-28 RX ORDER — DIPHENHYDRAMINE HYDROCHLORIDE 50 MG/ML
50 INJECTION, SOLUTION INTRAMUSCULAR; INTRAVENOUS
OUTPATIENT
Start: 2025-08-11

## 2025-07-28 RX ORDER — LIDOCAINE 50 MG/G
OINTMENT TOPICAL PRN
OUTPATIENT
Start: 2025-07-28

## 2025-07-28 RX ORDER — ONDANSETRON 2 MG/ML
8 INJECTION INTRAMUSCULAR; INTRAVENOUS
OUTPATIENT
Start: 2025-08-11

## 2025-07-28 RX ORDER — SODIUM CHLORIDE 0.9 % (FLUSH) 0.9 %
5-40 SYRINGE (ML) INJECTION PRN
OUTPATIENT
Start: 2025-08-11

## 2025-07-28 RX ORDER — BACITRACIN ZINC AND POLYMYXIN B SULFATE 500; 1000 [USP'U]/G; [USP'U]/G
OINTMENT TOPICAL PRN
OUTPATIENT
Start: 2025-07-28

## 2025-07-28 RX ORDER — SODIUM CHLORIDE 9 MG/ML
INJECTION, SOLUTION INTRAVENOUS PRN
OUTPATIENT
Start: 2025-08-11

## 2025-07-28 RX ORDER — TRIAMCINOLONE ACETONIDE 1 MG/G
OINTMENT TOPICAL PRN
OUTPATIENT
Start: 2025-07-28

## 2025-07-28 RX ORDER — SODIUM CHLORIDE 9 MG/ML
5-250 INJECTION, SOLUTION INTRAVENOUS PRN
OUTPATIENT
Start: 2025-08-11

## 2025-07-28 RX ORDER — LIDOCAINE HYDROCHLORIDE 20 MG/ML
JELLY TOPICAL PRN
OUTPATIENT
Start: 2025-07-28

## 2025-07-28 RX ORDER — GINSENG 100 MG
CAPSULE ORAL PRN
OUTPATIENT
Start: 2025-07-28

## 2025-07-28 RX ORDER — ACETAMINOPHEN 325 MG/1
650 TABLET ORAL
OUTPATIENT
Start: 2025-08-11

## 2025-07-28 RX ORDER — LIDOCAINE 40 MG/G
CREAM TOPICAL PRN
OUTPATIENT
Start: 2025-07-28

## 2025-07-28 RX ORDER — HEPARIN 100 UNIT/ML
500 SYRINGE INTRAVENOUS PRN
OUTPATIENT
Start: 2025-08-11

## 2025-07-28 RX ORDER — SODIUM CHLORIDE 9 MG/ML
5-250 INJECTION, SOLUTION INTRAVENOUS PRN
Status: DISCONTINUED | OUTPATIENT
Start: 2025-07-28 | End: 2025-07-29 | Stop reason: HOSPADM

## 2025-07-28 RX ORDER — SODIUM CHLORIDE 0.9 % (FLUSH) 0.9 %
5-40 SYRINGE (ML) INJECTION PRN
Status: DISCONTINUED | OUTPATIENT
Start: 2025-07-28 | End: 2025-07-29 | Stop reason: HOSPADM

## 2025-07-28 RX ORDER — SODIUM CHLOR/HYPOCHLOROUS ACID 0.033 %
SOLUTION, IRRIGATION IRRIGATION PRN
OUTPATIENT
Start: 2025-07-28

## 2025-07-28 RX ORDER — CLOBETASOL PROPIONATE 0.5 MG/G
OINTMENT TOPICAL PRN
OUTPATIENT
Start: 2025-07-28

## 2025-07-28 RX ORDER — HYDROCORTISONE SODIUM SUCCINATE 100 MG/2ML
100 INJECTION INTRAMUSCULAR; INTRAVENOUS
OUTPATIENT
Start: 2025-08-11

## 2025-07-28 RX ORDER — NEOMYCIN/BACITRACIN/POLYMYXINB 3.5-400-5K
OINTMENT (GRAM) TOPICAL PRN
OUTPATIENT
Start: 2025-07-28

## 2025-07-28 RX ORDER — EPINEPHRINE 1 MG/ML
0.3 INJECTION, SOLUTION, CONCENTRATE INTRAVENOUS PRN
OUTPATIENT
Start: 2025-08-11

## 2025-07-28 RX ORDER — MUPIROCIN 2 %
OINTMENT (GRAM) TOPICAL PRN
OUTPATIENT
Start: 2025-07-28

## 2025-07-28 RX ORDER — LIDOCAINE HYDROCHLORIDE 40 MG/ML
SOLUTION TOPICAL PRN
OUTPATIENT
Start: 2025-07-28

## 2025-07-28 RX ORDER — BETAMETHASONE DIPROPIONATE 0.5 MG/G
CREAM TOPICAL PRN
OUTPATIENT
Start: 2025-07-28

## 2025-07-28 RX ORDER — ALBUTEROL SULFATE 90 UG/1
4 INHALANT RESPIRATORY (INHALATION) PRN
OUTPATIENT
Start: 2025-08-11

## 2025-07-28 RX ADMIN — IRON SUCROSE 400 MG: 20 INJECTION, SOLUTION INTRAVENOUS at 11:40

## 2025-07-28 NOTE — PATIENT INSTRUCTIONS
[facility] Therapy   Physician Orders and Discharge Instructions  [address] [place] [address]  [address]  [place] [address]  Telephone: [phone]      FAX [phone]    NAME:  Devin Maki  YOB: 1940  MEDICAL RECORD NUMBER:  [id number]  DATE:  7/28/2025    Discharge condition: Stable    Discharge to: Home    Left via:Private automobile    Accompanied by:  daughter    ECF/[facility]: [facility]        Coloplast 2-piece Custom      Change Pouch and Wafer: Every 3-5 days and as needed for leaking   Supplies:       Brava protective Seal thin #72899  Brava Powder #26078  Brava Elastic Barrier Strips #669139  3M Cavilon No Sting Skin Barrier #3343  Esenta Sting Free Adhesive Remover Spray #Convatec [id number]  Coloplast #73149 urostomy pouch  Wafer # 08574 light convex wafer    Will order some samples of the Stoma Genie ostomy output capture cartridge [email or web address] for patient to trial.      Gather supplies needed to change pouch and wafer.     Gently remove your old pouch     Look at the back of pouch before throwing away to see how it has worn.    Wash the skin around your stoma with water. Pat completely dry.    Use the measuring guide to measure your stoma size or use the old pattern to trace correct size opening on plastic backing of wafer.     Cut out the opening and remove the plastic backing from the wafer.      Optional - Apply Protective Barrier to skin around the stoma or to the back of the wafer.  If skin irritated dust with stoma powder, dust off the excess, then apply no sting skin protectant over the powdered areas    Apply Barrier Ring (gasket) to the opening of the wafer, then center your wafer by centering the opening of the wafer around the stoma and press the wafer down firmly.      Be sure to roll the end up 3 times and flip the tabs over to secure.    Lay quietly for 15-20 minutes to allow the adhesives to mold to your skin.    Empty

## 2025-07-28 NOTE — PLAN OF CARE
Problem: Wound:  Goal: Will show signs of wound healing; wound closure and no evidence of infection  Description: Will show signs of wound healing; wound closure and no evidence of infection  Outcome: Progressing   Urostomy Care Visit  
[facility]   Ostomy Note      NAME:  Devin Maki  MEDICAL RECORD NUMBER:  [id number]  AGE: 85 y.o.   GENDER:  male  :  1940  TODAY'S DATE:  2025    Subjective       Chief Complaint   Patient presents with    Wound Check     Urostomy care         HISTORY of PRESENT ILLNESS HPI    Devin Maki is a 85 y.o. male Established patient , who presents today for ostomy/stoma evaluation.   History of Ostomy Context: 25 Robotic assisted laparoscopic radical cystectomy, prostatectomy, positive () pelvic lymphadenectomy, intracorporeal ileal conduit, omental pedicle flap by Dr Anthony Jackman/[facility]   Wound/Ulcer Pain Timing/Severity: none  Quality of pain: N/A  Severity:  0 / 10   Associated Signs/Symptoms: none    PAST MEDICAL HISTORY        Diagnosis Date    Allergic rhinitis     Bladder cancer (HCC)     CAD (coronary artery disease)     Colon polyps     Gastritis     Hiatal hernia     Hyperlipidemia     Hypertension        PAST SURGICAL HISTORY    Past Surgical History:   Procedure Laterality Date    BLADDER SURGERY  2025    bladder removed    COLONOSCOPY  2011    Dr. Torres    COLONOSCOPY  2007    Dr. Torres--colon polyps    COLONOSCOPY N/A 2016    Dr Gauthier-Tubular AP x 1, dysplasia (-), HP x 3, 5 yr recall    CORONARY ARTERY BYPASS GRAFT  2001    stents    CORONARY ARTERY BYPASS GRAFT  2000    UPPER GASTROINTESTINAL ENDOSCOPY  2011    [facility]    VASCULAR SURGERY      carotid artery cleaned out       FAMILY HISTORY    Family History   Problem Relation Age of Onset    Cancer Mother         tongue cancer    Heart Disease Father     Stomach Cancer Sister     Prostate Cancer Brother     Unknown Maternal Grandmother     Unknown Maternal Grandfather     Unknown Paternal Grandmother     Unknown Paternal Grandfather     Colon Cancer Neg Hx     Colon Polyps Neg Hx     Esophageal Cancer Neg Hx     Liver Disease Neg Hx     Liver Cancer 
physically assist patient in ambulating into treatment room, and/or on off chair/bed.  Requires assistance to bathroom.   []   2   Full Assistance Requires assistance of at least two staff members to transfer patient into treatment room and/or on/off bed/chair. \"Total Transfer\".  Unable to use bathroom requires bedside commode and/or bedpan []   3       Teaching Effort Documented in [facility] Clinical Note  Effort Definition Points   No Teaching  []   0   General Initial/Simple lesson:  Assess readiness to learn, assess patient learning style to determine educational flow/special needs for learning.  Teaching related to 1-3 topics  Documentation in [facility] completed.   [x]   1   Intermediate Assess readiness to learn, assess patient learning style to determine educational flow/special needs for learning.  Teaching related to 3-4 topics.   Hernia belt application and care considerations  Documentation in [facility] completed.   []   2   Complex Assess readiness to learn, assess patient learning style to determine educational flow/special needs for learning.  Teaching of greater than 5 additional topics   Pre-operative ostomy education with review of written resources for patient/family/caregiver as needed.  Demonstration/return demonstration of ostomy irrigation  Documentation in [facility] completed.   []   3     Patient Assessment and Planning in [facility] Clinical Note   Planning Definition Points   Simple Simple pouch change procedure completed and reviewed with patient/family/caregiver   Documentation in [facility] completed.     [x]   1   Intermediate Moderate level of follow-up needs:   Pouch change/discharge procedure revised and reviewed with patient/caregiver.    Communications with outside resources; i.e. Telephone calls to Surgeon/ PCP, family/caregiver, home health, ECF.   Documentation in [facility] completed.     []   2   Complex Complex level of instructions/changes:   Family/Caregiver learning/demonstration/return

## 2025-07-28 NOTE — PROGRESS NOTES
Pt arrived to OPIT and PIV placed. Venofer 400mg administered. Pt tolerated infusion well. RN went to flush and remove PIV. RN notice slight swelling at IV site. Pt stated IV had been hurting for last 20 minutes but did not think it was a concern. Educated patient to voice any pain or concern with a PIV. PIV removed and wrapped. Educated patient to keep arm elevated of swelling continues.     Electronically signed by Nhung Gamez RN on 7/28/2025 at 3:09 PM

## 2025-07-29 ENCOUNTER — HOSPITAL ENCOUNTER (OUTPATIENT)
Dept: RADIATION ONCOLOGY | Facility: HOSPITAL | Age: 85
Discharge: HOME | End: 2025-07-29

## 2025-07-29 LAB
RAD ONC ARIA COURSE ID: NORMAL
RAD ONC ARIA COURSE INTENT: NORMAL
RAD ONC ARIA COURSE LAST TREATMENT DATE: NORMAL
RAD ONC ARIA COURSE START DATE: NORMAL
RAD ONC ARIA COURSE TREATMENT ELAPSED DAYS: 5
RAD ONC ARIA FIRST TREATMENT DATE: NORMAL
RAD ONC ARIA PLAN FRACTIONS TREATED TO DATE: 4
RAD ONC ARIA PLAN ID: NORMAL
RAD ONC ARIA PLAN PRESCRIBED DOSE PER FRACTION: 1.8 GY
RAD ONC ARIA PLAN PRIMARY REFERENCE POINT: NORMAL
RAD ONC ARIA PLAN TOTAL FRACTIONS PRESCRIBED: 28
RAD ONC ARIA PLAN TOTAL PRESCRIBED DOSE: 5040 CGY
RAD ONC ARIA REFERENCE POINT DOSAGE GIVEN TO DATE: 7.2 GY
RAD ONC ARIA REFERENCE POINT ID: NORMAL
RAD ONC ARIA REFERENCE POINT SESSION DOSAGE GIVEN: 1.8 GY

## 2025-07-29 PROCEDURE — 77014 CHG CT GUIDANCE RADIATION THERAPY FLDS PLACEMENT: CPT | Performed by: RADIOLOGY

## 2025-07-29 PROCEDURE — 77386: CPT | Performed by: RADIOLOGY

## 2025-07-30 ENCOUNTER — HOSPITAL ENCOUNTER (OUTPATIENT)
Dept: RADIATION ONCOLOGY | Facility: HOSPITAL | Age: 85
Discharge: HOME | End: 2025-07-30

## 2025-07-30 ENCOUNTER — HOSPITAL ENCOUNTER (OUTPATIENT)
Dept: PHYSICAL THERAPY | Age: 85
Setting detail: THERAPIES SERIES
End: 2025-07-30
Payer: MEDICARE

## 2025-07-30 LAB
RAD ONC ARIA COURSE ID: NORMAL
RAD ONC ARIA COURSE INTENT: NORMAL
RAD ONC ARIA COURSE LAST TREATMENT DATE: NORMAL
RAD ONC ARIA COURSE START DATE: NORMAL
RAD ONC ARIA COURSE TREATMENT ELAPSED DAYS: 6
RAD ONC ARIA FIRST TREATMENT DATE: NORMAL
RAD ONC ARIA PLAN FRACTIONS TREATED TO DATE: 5
RAD ONC ARIA PLAN ID: NORMAL
RAD ONC ARIA PLAN PRESCRIBED DOSE PER FRACTION: 1.8 GY
RAD ONC ARIA PLAN PRIMARY REFERENCE POINT: NORMAL
RAD ONC ARIA PLAN TOTAL FRACTIONS PRESCRIBED: 28
RAD ONC ARIA PLAN TOTAL PRESCRIBED DOSE: 5040 CGY
RAD ONC ARIA REFERENCE POINT DOSAGE GIVEN TO DATE: 9 GY
RAD ONC ARIA REFERENCE POINT ID: NORMAL
RAD ONC ARIA REFERENCE POINT SESSION DOSAGE GIVEN: 1.8 GY

## 2025-07-30 PROCEDURE — 77336 RADIATION PHYSICS CONSULT: CPT | Performed by: RADIOLOGY

## 2025-07-30 PROCEDURE — 77386: CPT | Performed by: RADIOLOGY

## 2025-07-30 PROCEDURE — 77014 CHG CT GUIDANCE RADIATION THERAPY FLDS PLACEMENT: CPT | Performed by: RADIOLOGY

## 2025-07-31 ENCOUNTER — HOSPITAL ENCOUNTER (OUTPATIENT)
Dept: RADIATION ONCOLOGY | Facility: HOSPITAL | Age: 85
Discharge: HOME | End: 2025-07-31

## 2025-07-31 LAB
RAD ONC ARIA COURSE ID: NORMAL
RAD ONC ARIA COURSE INTENT: NORMAL
RAD ONC ARIA COURSE LAST TREATMENT DATE: NORMAL
RAD ONC ARIA COURSE START DATE: NORMAL
RAD ONC ARIA COURSE TREATMENT ELAPSED DAYS: 7
RAD ONC ARIA FIRST TREATMENT DATE: NORMAL
RAD ONC ARIA PLAN FRACTIONS TREATED TO DATE: 6
RAD ONC ARIA PLAN ID: NORMAL
RAD ONC ARIA PLAN PRESCRIBED DOSE PER FRACTION: 1.8 GY
RAD ONC ARIA PLAN PRIMARY REFERENCE POINT: NORMAL
RAD ONC ARIA PLAN TOTAL FRACTIONS PRESCRIBED: 28
RAD ONC ARIA PLAN TOTAL PRESCRIBED DOSE: 5040 CGY
RAD ONC ARIA REFERENCE POINT DOSAGE GIVEN TO DATE: 10.8 GY
RAD ONC ARIA REFERENCE POINT ID: NORMAL
RAD ONC ARIA REFERENCE POINT SESSION DOSAGE GIVEN: 1.8 GY

## 2025-07-31 PROCEDURE — 77014 CHG CT GUIDANCE RADIATION THERAPY FLDS PLACEMENT: CPT | Performed by: RADIOLOGY

## 2025-07-31 PROCEDURE — 77386: CPT | Performed by: RADIOLOGY

## 2025-08-01 ENCOUNTER — HOSPITAL ENCOUNTER (OUTPATIENT)
Dept: RADIATION ONCOLOGY | Facility: HOSPITAL | Age: 85
Discharge: HOME | End: 2025-08-01

## 2025-08-01 LAB
RAD ONC ARIA COURSE ID: NORMAL
RAD ONC ARIA COURSE INTENT: NORMAL
RAD ONC ARIA COURSE LAST TREATMENT DATE: NORMAL
RAD ONC ARIA COURSE START DATE: NORMAL
RAD ONC ARIA COURSE TREATMENT ELAPSED DAYS: 8
RAD ONC ARIA FIRST TREATMENT DATE: NORMAL
RAD ONC ARIA PLAN FRACTIONS TREATED TO DATE: 7
RAD ONC ARIA PLAN ID: NORMAL
RAD ONC ARIA PLAN PRESCRIBED DOSE PER FRACTION: 1.8 GY
RAD ONC ARIA PLAN PRIMARY REFERENCE POINT: NORMAL
RAD ONC ARIA PLAN TOTAL FRACTIONS PRESCRIBED: 28
RAD ONC ARIA PLAN TOTAL PRESCRIBED DOSE: 5040 CGY
RAD ONC ARIA REFERENCE POINT DOSAGE GIVEN TO DATE: 12.6 GY
RAD ONC ARIA REFERENCE POINT ID: NORMAL
RAD ONC ARIA REFERENCE POINT SESSION DOSAGE GIVEN: 1.8 GY

## 2025-08-04 ENCOUNTER — HOSPITAL ENCOUNTER (OUTPATIENT)
Dept: RADIATION ONCOLOGY | Facility: HOSPITAL | Age: 85
Discharge: HOME | End: 2025-08-04
Payer: MEDICARE

## 2025-08-04 LAB
RAD ONC ARIA COURSE ID: NORMAL
RAD ONC ARIA COURSE INTENT: NORMAL
RAD ONC ARIA COURSE LAST TREATMENT DATE: NORMAL
RAD ONC ARIA COURSE START DATE: NORMAL
RAD ONC ARIA COURSE TREATMENT ELAPSED DAYS: 11
RAD ONC ARIA FIRST TREATMENT DATE: NORMAL
RAD ONC ARIA PLAN FRACTIONS TREATED TO DATE: 8
RAD ONC ARIA PLAN ID: NORMAL
RAD ONC ARIA PLAN PRESCRIBED DOSE PER FRACTION: 1.8 GY
RAD ONC ARIA PLAN PRIMARY REFERENCE POINT: NORMAL
RAD ONC ARIA PLAN TOTAL FRACTIONS PRESCRIBED: 28
RAD ONC ARIA PLAN TOTAL PRESCRIBED DOSE: 5040 CGY
RAD ONC ARIA REFERENCE POINT DOSAGE GIVEN TO DATE: 14.4 GY
RAD ONC ARIA REFERENCE POINT ID: NORMAL
RAD ONC ARIA REFERENCE POINT SESSION DOSAGE GIVEN: 1.8 GY

## 2025-08-05 ENCOUNTER — HOSPITAL ENCOUNTER (OUTPATIENT)
Dept: PHYSICAL THERAPY | Age: 85
Setting detail: THERAPIES SERIES
Discharge: HOME | End: 2025-08-05
Payer: MEDICARE

## 2025-08-05 ENCOUNTER — HOSPITAL ENCOUNTER (OUTPATIENT)
Dept: RADIATION ONCOLOGY | Facility: HOSPITAL | Age: 85
Discharge: HOME | End: 2025-08-05

## 2025-08-05 LAB
RAD ONC ARIA COURSE ID: NORMAL
RAD ONC ARIA COURSE INTENT: NORMAL
RAD ONC ARIA COURSE LAST TREATMENT DATE: NORMAL
RAD ONC ARIA COURSE START DATE: NORMAL
RAD ONC ARIA COURSE TREATMENT ELAPSED DAYS: 12
RAD ONC ARIA FIRST TREATMENT DATE: NORMAL
RAD ONC ARIA PLAN FRACTIONS TREATED TO DATE: 9
RAD ONC ARIA PLAN ID: NORMAL
RAD ONC ARIA PLAN PRESCRIBED DOSE PER FRACTION: 1.8 GY
RAD ONC ARIA PLAN PRIMARY REFERENCE POINT: NORMAL
RAD ONC ARIA PLAN TOTAL FRACTIONS PRESCRIBED: 28
RAD ONC ARIA PLAN TOTAL PRESCRIBED DOSE: 5040 CGY
RAD ONC ARIA REFERENCE POINT DOSAGE GIVEN TO DATE: 16.2 GY
RAD ONC ARIA REFERENCE POINT ID: NORMAL
RAD ONC ARIA REFERENCE POINT SESSION DOSAGE GIVEN: 1.8 GY

## 2025-08-05 PROCEDURE — 97110 THERAPEUTIC EXERCISES: CPT

## 2025-08-06 ENCOUNTER — HOSPITAL ENCOUNTER (OUTPATIENT)
Dept: RADIATION ONCOLOGY | Facility: HOSPITAL | Age: 85
Discharge: HOME | End: 2025-08-06

## 2025-08-06 LAB
RAD ONC ARIA COURSE ID: NORMAL
RAD ONC ARIA COURSE INTENT: NORMAL
RAD ONC ARIA COURSE LAST TREATMENT DATE: NORMAL
RAD ONC ARIA COURSE START DATE: NORMAL
RAD ONC ARIA COURSE TREATMENT ELAPSED DAYS: 13
RAD ONC ARIA FIRST TREATMENT DATE: NORMAL
RAD ONC ARIA PLAN FRACTIONS TREATED TO DATE: 10
RAD ONC ARIA PLAN ID: NORMAL
RAD ONC ARIA PLAN PRESCRIBED DOSE PER FRACTION: 1.8 GY
RAD ONC ARIA PLAN PRIMARY REFERENCE POINT: NORMAL
RAD ONC ARIA PLAN TOTAL FRACTIONS PRESCRIBED: 28
RAD ONC ARIA PLAN TOTAL PRESCRIBED DOSE: 5040 CGY
RAD ONC ARIA REFERENCE POINT DOSAGE GIVEN TO DATE: 18 GY
RAD ONC ARIA REFERENCE POINT ID: NORMAL
RAD ONC ARIA REFERENCE POINT SESSION DOSAGE GIVEN: 1.8 GY

## 2025-08-07 ENCOUNTER — HOSPITAL ENCOUNTER (OUTPATIENT)
Dept: RADIATION ONCOLOGY | Facility: HOSPITAL | Age: 85
Discharge: HOME | End: 2025-08-07

## 2025-08-07 ENCOUNTER — HOSPITAL ENCOUNTER (OUTPATIENT)
Dept: PHYSICAL THERAPY | Age: 85
Setting detail: THERAPIES SERIES
Discharge: HOME | End: 2025-08-07
Payer: MEDICARE

## 2025-08-07 LAB
RAD ONC ARIA COURSE ID: NORMAL
RAD ONC ARIA COURSE INTENT: NORMAL
RAD ONC ARIA COURSE LAST TREATMENT DATE: NORMAL
RAD ONC ARIA COURSE START DATE: NORMAL
RAD ONC ARIA COURSE TREATMENT ELAPSED DAYS: 14
RAD ONC ARIA FIRST TREATMENT DATE: NORMAL
RAD ONC ARIA PLAN FRACTIONS TREATED TO DATE: 11
RAD ONC ARIA PLAN ID: NORMAL
RAD ONC ARIA PLAN PRESCRIBED DOSE PER FRACTION: 1.8 GY
RAD ONC ARIA PLAN PRIMARY REFERENCE POINT: NORMAL
RAD ONC ARIA PLAN TOTAL FRACTIONS PRESCRIBED: 28
RAD ONC ARIA PLAN TOTAL PRESCRIBED DOSE: 5040 CGY
RAD ONC ARIA REFERENCE POINT DOSAGE GIVEN TO DATE: 19.8 GY
RAD ONC ARIA REFERENCE POINT ID: NORMAL
RAD ONC ARIA REFERENCE POINT SESSION DOSAGE GIVEN: 1.8 GY

## 2025-08-07 PROCEDURE — 97110 THERAPEUTIC EXERCISES: CPT

## 2025-08-08 ENCOUNTER — HOSPITAL ENCOUNTER (OUTPATIENT)
Dept: RADIATION ONCOLOGY | Facility: HOSPITAL | Age: 85
Discharge: HOME | End: 2025-08-08

## 2025-08-08 LAB
RAD ONC ARIA COURSE ID: NORMAL
RAD ONC ARIA COURSE INTENT: NORMAL
RAD ONC ARIA COURSE LAST TREATMENT DATE: NORMAL
RAD ONC ARIA COURSE START DATE: NORMAL
RAD ONC ARIA COURSE TREATMENT ELAPSED DAYS: 15
RAD ONC ARIA FIRST TREATMENT DATE: NORMAL
RAD ONC ARIA PLAN FRACTIONS TREATED TO DATE: 12
RAD ONC ARIA PLAN ID: NORMAL
RAD ONC ARIA PLAN PRESCRIBED DOSE PER FRACTION: 1.8 GY
RAD ONC ARIA PLAN PRIMARY REFERENCE POINT: NORMAL
RAD ONC ARIA PLAN TOTAL FRACTIONS PRESCRIBED: 28
RAD ONC ARIA PLAN TOTAL PRESCRIBED DOSE: 5040 CGY
RAD ONC ARIA REFERENCE POINT DOSAGE GIVEN TO DATE: 21.6 GY
RAD ONC ARIA REFERENCE POINT ID: NORMAL
RAD ONC ARIA REFERENCE POINT SESSION DOSAGE GIVEN: 1.8 GY

## 2025-08-11 ENCOUNTER — HOSPITAL ENCOUNTER (OUTPATIENT)
Dept: RADIATION ONCOLOGY | Facility: HOSPITAL | Age: 85
Discharge: HOME | End: 2025-08-11
Payer: MEDICARE

## 2025-08-11 LAB
RAD ONC ARIA COURSE ID: NORMAL
RAD ONC ARIA COURSE INTENT: NORMAL
RAD ONC ARIA COURSE LAST TREATMENT DATE: NORMAL
RAD ONC ARIA COURSE START DATE: NORMAL
RAD ONC ARIA COURSE TREATMENT ELAPSED DAYS: 18
RAD ONC ARIA FIRST TREATMENT DATE: NORMAL
RAD ONC ARIA PLAN FRACTIONS TREATED TO DATE: 13
RAD ONC ARIA PLAN ID: NORMAL
RAD ONC ARIA PLAN PRESCRIBED DOSE PER FRACTION: 1.8 GY
RAD ONC ARIA PLAN PRIMARY REFERENCE POINT: NORMAL
RAD ONC ARIA PLAN TOTAL FRACTIONS PRESCRIBED: 28
RAD ONC ARIA PLAN TOTAL PRESCRIBED DOSE: 5040 CGY
RAD ONC ARIA REFERENCE POINT DOSAGE GIVEN TO DATE: 23.4 GY
RAD ONC ARIA REFERENCE POINT ID: NORMAL
RAD ONC ARIA REFERENCE POINT SESSION DOSAGE GIVEN: 1.8 GY

## 2025-08-12 ENCOUNTER — HOSPITAL ENCOUNTER (OUTPATIENT)
Dept: RADIATION ONCOLOGY | Facility: HOSPITAL | Age: 85
Discharge: HOME | End: 2025-08-12

## 2025-08-12 ENCOUNTER — HOSPITAL ENCOUNTER (OUTPATIENT)
Dept: PHYSICAL THERAPY | Age: 85
Setting detail: THERAPIES SERIES
Discharge: HOME | End: 2025-08-12
Payer: MEDICARE

## 2025-08-12 LAB
RAD ONC ARIA COURSE ID: NORMAL
RAD ONC ARIA COURSE INTENT: NORMAL
RAD ONC ARIA COURSE LAST TREATMENT DATE: NORMAL
RAD ONC ARIA COURSE START DATE: NORMAL
RAD ONC ARIA COURSE TREATMENT ELAPSED DAYS: 19
RAD ONC ARIA FIRST TREATMENT DATE: NORMAL
RAD ONC ARIA PLAN FRACTIONS TREATED TO DATE: 14
RAD ONC ARIA PLAN ID: NORMAL
RAD ONC ARIA PLAN PRESCRIBED DOSE PER FRACTION: 1.8 GY
RAD ONC ARIA PLAN PRIMARY REFERENCE POINT: NORMAL
RAD ONC ARIA PLAN TOTAL FRACTIONS PRESCRIBED: 28
RAD ONC ARIA PLAN TOTAL PRESCRIBED DOSE: 5040 CGY
RAD ONC ARIA REFERENCE POINT DOSAGE GIVEN TO DATE: 25.2 GY
RAD ONC ARIA REFERENCE POINT ID: NORMAL
RAD ONC ARIA REFERENCE POINT SESSION DOSAGE GIVEN: 1.8 GY

## 2025-08-12 PROCEDURE — 97110 THERAPEUTIC EXERCISES: CPT

## 2025-08-13 ENCOUNTER — HOSPITAL ENCOUNTER (OUTPATIENT)
Dept: RADIATION ONCOLOGY | Facility: HOSPITAL | Age: 85
Discharge: HOME | End: 2025-08-13

## 2025-08-13 LAB
RAD ONC ARIA COURSE ID: NORMAL
RAD ONC ARIA COURSE INTENT: NORMAL
RAD ONC ARIA COURSE LAST TREATMENT DATE: NORMAL
RAD ONC ARIA COURSE START DATE: NORMAL
RAD ONC ARIA COURSE TREATMENT ELAPSED DAYS: 20
RAD ONC ARIA FIRST TREATMENT DATE: NORMAL
RAD ONC ARIA PLAN FRACTIONS TREATED TO DATE: 15
RAD ONC ARIA PLAN ID: NORMAL
RAD ONC ARIA PLAN PRESCRIBED DOSE PER FRACTION: 1.8 GY
RAD ONC ARIA PLAN PRIMARY REFERENCE POINT: NORMAL
RAD ONC ARIA PLAN TOTAL FRACTIONS PRESCRIBED: 28
RAD ONC ARIA PLAN TOTAL PRESCRIBED DOSE: 5040 CGY
RAD ONC ARIA REFERENCE POINT DOSAGE GIVEN TO DATE: 27 GY
RAD ONC ARIA REFERENCE POINT ID: NORMAL
RAD ONC ARIA REFERENCE POINT SESSION DOSAGE GIVEN: 1.8 GY

## 2025-08-14 ENCOUNTER — HOSPITAL ENCOUNTER (OUTPATIENT)
Dept: RADIATION ONCOLOGY | Facility: HOSPITAL | Age: 85
Discharge: HOME | End: 2025-08-14

## 2025-08-14 ENCOUNTER — APPOINTMENT (OUTPATIENT)
Dept: PHYSICAL THERAPY | Age: 85
End: 2025-08-14
Payer: MEDICARE

## 2025-08-14 LAB
RAD ONC ARIA COURSE ID: NORMAL
RAD ONC ARIA COURSE INTENT: NORMAL
RAD ONC ARIA COURSE LAST TREATMENT DATE: NORMAL
RAD ONC ARIA COURSE START DATE: NORMAL
RAD ONC ARIA COURSE TREATMENT ELAPSED DAYS: 21
RAD ONC ARIA FIRST TREATMENT DATE: NORMAL
RAD ONC ARIA PLAN FRACTIONS TREATED TO DATE: 16
RAD ONC ARIA PLAN ID: NORMAL
RAD ONC ARIA PLAN PRESCRIBED DOSE PER FRACTION: 1.8 GY
RAD ONC ARIA PLAN PRIMARY REFERENCE POINT: NORMAL
RAD ONC ARIA PLAN TOTAL FRACTIONS PRESCRIBED: 28
RAD ONC ARIA PLAN TOTAL PRESCRIBED DOSE: 5040 CGY
RAD ONC ARIA REFERENCE POINT DOSAGE GIVEN TO DATE: 28.8 GY
RAD ONC ARIA REFERENCE POINT ID: NORMAL
RAD ONC ARIA REFERENCE POINT SESSION DOSAGE GIVEN: 1.8 GY

## 2025-08-15 ENCOUNTER — HOSPITAL ENCOUNTER (OUTPATIENT)
Dept: RADIATION ONCOLOGY | Facility: HOSPITAL | Age: 85
Discharge: HOME | End: 2025-08-15

## 2025-08-15 LAB
RAD ONC ARIA COURSE ID: NORMAL
RAD ONC ARIA COURSE INTENT: NORMAL
RAD ONC ARIA COURSE LAST TREATMENT DATE: NORMAL
RAD ONC ARIA COURSE START DATE: NORMAL
RAD ONC ARIA COURSE TREATMENT ELAPSED DAYS: 22
RAD ONC ARIA FIRST TREATMENT DATE: NORMAL
RAD ONC ARIA PLAN FRACTIONS TREATED TO DATE: 17
RAD ONC ARIA PLAN ID: NORMAL
RAD ONC ARIA PLAN PRESCRIBED DOSE PER FRACTION: 1.8 GY
RAD ONC ARIA PLAN PRIMARY REFERENCE POINT: NORMAL
RAD ONC ARIA PLAN TOTAL FRACTIONS PRESCRIBED: 28
RAD ONC ARIA PLAN TOTAL PRESCRIBED DOSE: 5040 CGY
RAD ONC ARIA REFERENCE POINT DOSAGE GIVEN TO DATE: 30.6 GY
RAD ONC ARIA REFERENCE POINT ID: NORMAL
RAD ONC ARIA REFERENCE POINT SESSION DOSAGE GIVEN: 1.8 GY

## 2025-08-18 ENCOUNTER — HOSPITAL ENCOUNTER (OUTPATIENT)
Dept: RADIATION ONCOLOGY | Facility: HOSPITAL | Age: 85
Discharge: HOME | End: 2025-08-18
Payer: MEDICARE

## 2025-08-18 ENCOUNTER — TELEPHONE (OUTPATIENT)
Dept: WOUND CARE | Age: 85
End: 2025-08-18

## 2025-08-18 LAB
RAD ONC ARIA COURSE ID: NORMAL
RAD ONC ARIA COURSE INTENT: NORMAL
RAD ONC ARIA COURSE LAST TREATMENT DATE: NORMAL
RAD ONC ARIA COURSE START DATE: NORMAL
RAD ONC ARIA COURSE TREATMENT ELAPSED DAYS: 25
RAD ONC ARIA FIRST TREATMENT DATE: NORMAL
RAD ONC ARIA PLAN FRACTIONS TREATED TO DATE: 18
RAD ONC ARIA PLAN ID: NORMAL
RAD ONC ARIA PLAN PRESCRIBED DOSE PER FRACTION: 1.8 GY
RAD ONC ARIA PLAN PRIMARY REFERENCE POINT: NORMAL
RAD ONC ARIA PLAN TOTAL FRACTIONS PRESCRIBED: 28
RAD ONC ARIA PLAN TOTAL PRESCRIBED DOSE: 5040 CGY
RAD ONC ARIA REFERENCE POINT DOSAGE GIVEN TO DATE: 32.4 GY
RAD ONC ARIA REFERENCE POINT ID: NORMAL
RAD ONC ARIA REFERENCE POINT SESSION DOSAGE GIVEN: 1.8 GY

## 2025-08-19 ENCOUNTER — HOSPITAL ENCOUNTER (OUTPATIENT)
Dept: RADIATION ONCOLOGY | Facility: HOSPITAL | Age: 85
Discharge: HOME | End: 2025-08-19

## 2025-08-19 ENCOUNTER — HOSPITAL ENCOUNTER (OUTPATIENT)
Dept: PHYSICAL THERAPY | Age: 85
Setting detail: THERAPIES SERIES
End: 2025-08-19
Payer: MEDICARE

## 2025-08-19 LAB
RAD ONC ARIA COURSE ID: NORMAL
RAD ONC ARIA COURSE INTENT: NORMAL
RAD ONC ARIA COURSE LAST TREATMENT DATE: NORMAL
RAD ONC ARIA COURSE START DATE: NORMAL
RAD ONC ARIA COURSE TREATMENT ELAPSED DAYS: 26
RAD ONC ARIA FIRST TREATMENT DATE: NORMAL
RAD ONC ARIA PLAN FRACTIONS TREATED TO DATE: 19
RAD ONC ARIA PLAN ID: NORMAL
RAD ONC ARIA PLAN PRESCRIBED DOSE PER FRACTION: 1.8 GY
RAD ONC ARIA PLAN PRIMARY REFERENCE POINT: NORMAL
RAD ONC ARIA PLAN TOTAL FRACTIONS PRESCRIBED: 28
RAD ONC ARIA PLAN TOTAL PRESCRIBED DOSE: 5040 CGY
RAD ONC ARIA REFERENCE POINT DOSAGE GIVEN TO DATE: 34.2 GY
RAD ONC ARIA REFERENCE POINT ID: NORMAL
RAD ONC ARIA REFERENCE POINT SESSION DOSAGE GIVEN: 1.8 GY

## 2025-08-20 ENCOUNTER — HOSPITAL ENCOUNTER (OUTPATIENT)
Dept: RADIATION ONCOLOGY | Facility: HOSPITAL | Age: 85
Discharge: HOME | End: 2025-08-20

## 2025-08-20 LAB
RAD ONC ARIA COURSE ID: NORMAL
RAD ONC ARIA COURSE INTENT: NORMAL
RAD ONC ARIA COURSE LAST TREATMENT DATE: NORMAL
RAD ONC ARIA COURSE START DATE: NORMAL
RAD ONC ARIA COURSE TREATMENT ELAPSED DAYS: 27
RAD ONC ARIA FIRST TREATMENT DATE: NORMAL
RAD ONC ARIA PLAN FRACTIONS TREATED TO DATE: 20
RAD ONC ARIA PLAN ID: NORMAL
RAD ONC ARIA PLAN PRESCRIBED DOSE PER FRACTION: 1.8 GY
RAD ONC ARIA PLAN PRIMARY REFERENCE POINT: NORMAL
RAD ONC ARIA PLAN TOTAL FRACTIONS PRESCRIBED: 28
RAD ONC ARIA PLAN TOTAL PRESCRIBED DOSE: 5040 CGY
RAD ONC ARIA REFERENCE POINT DOSAGE GIVEN TO DATE: 36 GY
RAD ONC ARIA REFERENCE POINT ID: NORMAL
RAD ONC ARIA REFERENCE POINT SESSION DOSAGE GIVEN: 1.8 GY

## 2025-08-21 ENCOUNTER — HOSPITAL ENCOUNTER (OUTPATIENT)
Dept: PHYSICAL THERAPY | Age: 85
Setting detail: THERAPIES SERIES
Discharge: HOME | End: 2025-08-21
Payer: MEDICARE

## 2025-08-21 ENCOUNTER — HOSPITAL ENCOUNTER (OUTPATIENT)
Dept: RADIATION ONCOLOGY | Facility: HOSPITAL | Age: 85
Discharge: HOME | End: 2025-08-21

## 2025-08-21 LAB
RAD ONC ARIA COURSE ID: NORMAL
RAD ONC ARIA COURSE INTENT: NORMAL
RAD ONC ARIA COURSE LAST TREATMENT DATE: NORMAL
RAD ONC ARIA COURSE START DATE: NORMAL
RAD ONC ARIA COURSE TREATMENT ELAPSED DAYS: 28
RAD ONC ARIA FIRST TREATMENT DATE: NORMAL
RAD ONC ARIA PLAN FRACTIONS TREATED TO DATE: 21
RAD ONC ARIA PLAN ID: NORMAL
RAD ONC ARIA PLAN PRESCRIBED DOSE PER FRACTION: 1.8 GY
RAD ONC ARIA PLAN PRIMARY REFERENCE POINT: NORMAL
RAD ONC ARIA PLAN TOTAL FRACTIONS PRESCRIBED: 28
RAD ONC ARIA PLAN TOTAL PRESCRIBED DOSE: 5040 CGY
RAD ONC ARIA REFERENCE POINT DOSAGE GIVEN TO DATE: 37.8 GY
RAD ONC ARIA REFERENCE POINT ID: NORMAL
RAD ONC ARIA REFERENCE POINT SESSION DOSAGE GIVEN: 1.8 GY

## 2025-08-21 PROCEDURE — 97110 THERAPEUTIC EXERCISES: CPT

## 2025-08-22 ENCOUNTER — HOSPITAL ENCOUNTER (OUTPATIENT)
Dept: RADIATION ONCOLOGY | Facility: HOSPITAL | Age: 85
Discharge: HOME | End: 2025-08-22

## 2025-08-22 LAB
RAD ONC ARIA COURSE ID: NORMAL
RAD ONC ARIA COURSE INTENT: NORMAL
RAD ONC ARIA COURSE LAST TREATMENT DATE: NORMAL
RAD ONC ARIA COURSE START DATE: NORMAL
RAD ONC ARIA COURSE TREATMENT ELAPSED DAYS: 29
RAD ONC ARIA FIRST TREATMENT DATE: NORMAL
RAD ONC ARIA PLAN FRACTIONS TREATED TO DATE: 22
RAD ONC ARIA PLAN ID: NORMAL
RAD ONC ARIA PLAN PRESCRIBED DOSE PER FRACTION: 1.8 GY
RAD ONC ARIA PLAN PRIMARY REFERENCE POINT: NORMAL
RAD ONC ARIA PLAN TOTAL FRACTIONS PRESCRIBED: 28
RAD ONC ARIA PLAN TOTAL PRESCRIBED DOSE: 5040 CGY
RAD ONC ARIA REFERENCE POINT DOSAGE GIVEN TO DATE: 39.6 GY
RAD ONC ARIA REFERENCE POINT ID: NORMAL
RAD ONC ARIA REFERENCE POINT SESSION DOSAGE GIVEN: 1.8 GY

## 2025-08-25 ENCOUNTER — HOSPITAL ENCOUNTER (OUTPATIENT)
Dept: RADIATION ONCOLOGY | Facility: HOSPITAL | Age: 85
Discharge: HOME | End: 2025-08-25
Payer: MEDICARE

## 2025-08-25 LAB
RAD ONC ARIA COURSE ID: NORMAL
RAD ONC ARIA COURSE INTENT: NORMAL
RAD ONC ARIA COURSE LAST TREATMENT DATE: NORMAL
RAD ONC ARIA COURSE START DATE: NORMAL
RAD ONC ARIA COURSE TREATMENT ELAPSED DAYS: 32
RAD ONC ARIA FIRST TREATMENT DATE: NORMAL
RAD ONC ARIA PLAN FRACTIONS TREATED TO DATE: 23
RAD ONC ARIA PLAN ID: NORMAL
RAD ONC ARIA PLAN PRESCRIBED DOSE PER FRACTION: 1.8 GY
RAD ONC ARIA PLAN PRIMARY REFERENCE POINT: NORMAL
RAD ONC ARIA PLAN TOTAL FRACTIONS PRESCRIBED: 28
RAD ONC ARIA PLAN TOTAL PRESCRIBED DOSE: 5040 CGY
RAD ONC ARIA REFERENCE POINT DOSAGE GIVEN TO DATE: 41.4 GY
RAD ONC ARIA REFERENCE POINT ID: NORMAL
RAD ONC ARIA REFERENCE POINT SESSION DOSAGE GIVEN: 1.8 GY

## 2025-08-26 ENCOUNTER — HOSPITAL ENCOUNTER (OUTPATIENT)
Dept: RADIATION ONCOLOGY | Facility: HOSPITAL | Age: 85
Discharge: HOME | End: 2025-08-26

## 2025-08-26 LAB
RAD ONC ARIA COURSE ID: NORMAL
RAD ONC ARIA COURSE INTENT: NORMAL
RAD ONC ARIA COURSE LAST TREATMENT DATE: NORMAL
RAD ONC ARIA COURSE START DATE: NORMAL
RAD ONC ARIA COURSE TREATMENT ELAPSED DAYS: 33
RAD ONC ARIA FIRST TREATMENT DATE: NORMAL
RAD ONC ARIA PLAN FRACTIONS TREATED TO DATE: 24
RAD ONC ARIA PLAN ID: NORMAL
RAD ONC ARIA PLAN PRESCRIBED DOSE PER FRACTION: 1.8 GY
RAD ONC ARIA PLAN PRIMARY REFERENCE POINT: NORMAL
RAD ONC ARIA PLAN TOTAL FRACTIONS PRESCRIBED: 28
RAD ONC ARIA PLAN TOTAL PRESCRIBED DOSE: 5040 CGY
RAD ONC ARIA REFERENCE POINT DOSAGE GIVEN TO DATE: 43.2 GY
RAD ONC ARIA REFERENCE POINT ID: NORMAL
RAD ONC ARIA REFERENCE POINT SESSION DOSAGE GIVEN: 1.8 GY

## 2025-08-27 ENCOUNTER — HOSPITAL ENCOUNTER (OUTPATIENT)
Dept: RADIATION ONCOLOGY | Facility: HOSPITAL | Age: 85
Discharge: HOME | End: 2025-08-27

## 2025-08-27 ENCOUNTER — HOSPITAL ENCOUNTER (OUTPATIENT)
Dept: PHYSICAL THERAPY | Age: 85
Setting detail: THERAPIES SERIES
Discharge: HOME | End: 2025-08-27
Payer: MEDICARE

## 2025-08-27 LAB
RAD ONC ARIA COURSE ID: NORMAL
RAD ONC ARIA COURSE INTENT: NORMAL
RAD ONC ARIA COURSE LAST TREATMENT DATE: NORMAL
RAD ONC ARIA COURSE START DATE: NORMAL
RAD ONC ARIA COURSE TREATMENT ELAPSED DAYS: 34
RAD ONC ARIA FIRST TREATMENT DATE: NORMAL
RAD ONC ARIA PLAN FRACTIONS TREATED TO DATE: 25
RAD ONC ARIA PLAN ID: NORMAL
RAD ONC ARIA PLAN PRESCRIBED DOSE PER FRACTION: 1.8 GY
RAD ONC ARIA PLAN PRIMARY REFERENCE POINT: NORMAL
RAD ONC ARIA PLAN TOTAL FRACTIONS PRESCRIBED: 28
RAD ONC ARIA PLAN TOTAL PRESCRIBED DOSE: 5040 CGY
RAD ONC ARIA REFERENCE POINT DOSAGE GIVEN TO DATE: 45 GY
RAD ONC ARIA REFERENCE POINT ID: NORMAL
RAD ONC ARIA REFERENCE POINT SESSION DOSAGE GIVEN: 1.8 GY

## 2025-08-27 PROCEDURE — 97110 THERAPEUTIC EXERCISES: CPT

## 2025-08-28 ENCOUNTER — HOSPITAL ENCOUNTER (OUTPATIENT)
Dept: RADIATION ONCOLOGY | Facility: HOSPITAL | Age: 85
Discharge: HOME | End: 2025-08-28

## 2025-08-28 LAB
RAD ONC ARIA COURSE ID: NORMAL
RAD ONC ARIA COURSE INTENT: NORMAL
RAD ONC ARIA COURSE LAST TREATMENT DATE: NORMAL
RAD ONC ARIA COURSE START DATE: NORMAL
RAD ONC ARIA COURSE TREATMENT ELAPSED DAYS: 35
RAD ONC ARIA FIRST TREATMENT DATE: NORMAL
RAD ONC ARIA PLAN FRACTIONS TREATED TO DATE: 26
RAD ONC ARIA PLAN ID: NORMAL
RAD ONC ARIA PLAN PRESCRIBED DOSE PER FRACTION: 1.8 GY
RAD ONC ARIA PLAN PRIMARY REFERENCE POINT: NORMAL
RAD ONC ARIA PLAN TOTAL FRACTIONS PRESCRIBED: 28
RAD ONC ARIA PLAN TOTAL PRESCRIBED DOSE: 5040 CGY
RAD ONC ARIA REFERENCE POINT DOSAGE GIVEN TO DATE: 46.8 GY
RAD ONC ARIA REFERENCE POINT ID: NORMAL
RAD ONC ARIA REFERENCE POINT SESSION DOSAGE GIVEN: 1.8 GY

## 2025-08-29 ENCOUNTER — HOSPITAL ENCOUNTER (OUTPATIENT)
Dept: RADIATION ONCOLOGY | Facility: HOSPITAL | Age: 85
Discharge: HOME | End: 2025-08-29

## 2025-08-29 LAB
RAD ONC ARIA COURSE ID: NORMAL
RAD ONC ARIA COURSE INTENT: NORMAL
RAD ONC ARIA COURSE LAST TREATMENT DATE: NORMAL
RAD ONC ARIA COURSE START DATE: NORMAL
RAD ONC ARIA COURSE TREATMENT ELAPSED DAYS: 36
RAD ONC ARIA FIRST TREATMENT DATE: NORMAL
RAD ONC ARIA PLAN FRACTIONS TREATED TO DATE: 27
RAD ONC ARIA PLAN ID: NORMAL
RAD ONC ARIA PLAN PRESCRIBED DOSE PER FRACTION: 1.8 GY
RAD ONC ARIA PLAN PRIMARY REFERENCE POINT: NORMAL
RAD ONC ARIA PLAN TOTAL FRACTIONS PRESCRIBED: 28
RAD ONC ARIA PLAN TOTAL PRESCRIBED DOSE: 5040 CGY
RAD ONC ARIA REFERENCE POINT DOSAGE GIVEN TO DATE: 48.6 GY
RAD ONC ARIA REFERENCE POINT ID: NORMAL
RAD ONC ARIA REFERENCE POINT SESSION DOSAGE GIVEN: 1.8 GY

## 2025-09-02 ENCOUNTER — HOSPITAL ENCOUNTER (OUTPATIENT)
Dept: PHYSICAL THERAPY | Age: 85
Setting detail: THERAPIES SERIES
Discharge: HOME | End: 2025-09-02
Payer: MEDICARE

## 2025-09-02 PROCEDURE — 97110 THERAPEUTIC EXERCISES: CPT
